# Patient Record
Sex: FEMALE | Race: WHITE | NOT HISPANIC OR LATINO | Employment: OTHER | URBAN - METROPOLITAN AREA
[De-identification: names, ages, dates, MRNs, and addresses within clinical notes are randomized per-mention and may not be internally consistent; named-entity substitution may affect disease eponyms.]

---

## 2019-10-22 ENCOUNTER — HOSPITAL ENCOUNTER (OUTPATIENT)
Facility: HOSPITAL | Age: 84
Setting detail: OBSERVATION
Discharge: HOME HEALTH | End: 2019-10-24
Attending: EMERGENCY MEDICINE | Admitting: INTERNAL MEDICINE

## 2019-10-22 ENCOUNTER — APPOINTMENT (OUTPATIENT)
Dept: GENERAL RADIOLOGY | Facility: HOSPITAL | Age: 84
End: 2019-10-22

## 2019-10-22 DIAGNOSIS — N18.9 ACUTE ON CHRONIC RENAL INSUFFICIENCY: ICD-10-CM

## 2019-10-22 DIAGNOSIS — N28.9 ACUTE ON CHRONIC RENAL INSUFFICIENCY: ICD-10-CM

## 2019-10-22 DIAGNOSIS — Z86.59 HISTORY OF DEMENTIA: ICD-10-CM

## 2019-10-22 DIAGNOSIS — Z86.79 HISTORY OF CONGESTIVE HEART FAILURE: ICD-10-CM

## 2019-10-22 DIAGNOSIS — Z86.79 HISTORY OF HYPERTENSION: ICD-10-CM

## 2019-10-22 DIAGNOSIS — I48.92 ATRIAL FLUTTER WITH RAPID VENTRICULAR RESPONSE (HCC): Primary | ICD-10-CM

## 2019-10-22 PROBLEM — I10 ESSENTIAL HYPERTENSION: Status: ACTIVE | Noted: 2019-10-22

## 2019-10-22 PROBLEM — N17.9 AKI (ACUTE KIDNEY INJURY) (HCC): Status: ACTIVE | Noted: 2019-10-22

## 2019-10-22 PROBLEM — D72.829 LEUKOCYTOSIS: Status: ACTIVE | Noted: 2019-10-22

## 2019-10-22 PROBLEM — R77.8 ELEVATED TROPONIN: Status: ACTIVE | Noted: 2019-10-22

## 2019-10-22 PROBLEM — E87.1 HYPONATREMIA: Status: ACTIVE | Noted: 2019-10-22

## 2019-10-22 PROBLEM — I95.9 HYPOTENSION: Status: ACTIVE | Noted: 2019-10-22

## 2019-10-22 PROBLEM — F03.90 DEMENTIA (HCC): Status: ACTIVE | Noted: 2019-10-22

## 2019-10-22 PROBLEM — I50.9 CHF (CONGESTIVE HEART FAILURE) (HCC): Status: ACTIVE | Noted: 2019-10-22

## 2019-10-22 PROBLEM — E11.9 TYPE 2 DIABETES MELLITUS (HCC): Status: ACTIVE | Noted: 2019-10-22

## 2019-10-22 LAB
ALBUMIN SERPL-MCNC: 4 G/DL (ref 3.5–5.2)
ALBUMIN/GLOB SERPL: 1.4 G/DL
ALP SERPL-CCNC: 48 U/L (ref 39–117)
ALT SERPL W P-5'-P-CCNC: 10 U/L (ref 1–33)
ANION GAP SERPL CALCULATED.3IONS-SCNC: 15 MMOL/L (ref 5–15)
AST SERPL-CCNC: 11 U/L (ref 1–32)
BASOPHILS # BLD AUTO: 0.02 10*3/MM3 (ref 0–0.2)
BASOPHILS NFR BLD AUTO: 0.1 % (ref 0–1.5)
BILIRUB SERPL-MCNC: 0.5 MG/DL (ref 0.2–1.2)
BUN BLD-MCNC: 90 MG/DL (ref 8–23)
BUN/CREAT SERPL: 24.6 (ref 7–25)
CALCIUM SPEC-SCNC: 9.3 MG/DL (ref 8.2–9.6)
CHLORIDE SERPL-SCNC: 93 MMOL/L (ref 98–107)
CO2 SERPL-SCNC: 22 MMOL/L (ref 22–29)
CREAT BLD-MCNC: 3.66 MG/DL (ref 0.57–1)
DEPRECATED RDW RBC AUTO: 44.8 FL (ref 37–54)
EOSINOPHIL # BLD AUTO: 0.06 10*3/MM3 (ref 0–0.4)
EOSINOPHIL NFR BLD AUTO: 0.4 % (ref 0.3–6.2)
ERYTHROCYTE [DISTWIDTH] IN BLOOD BY AUTOMATED COUNT: 14.5 % (ref 12.3–15.4)
GFR SERPL CREATININE-BSD FRML MDRD: 12 ML/MIN/1.73
GFR SERPL CREATININE-BSD FRML MDRD: ABNORMAL ML/MIN/{1.73_M2}
GLOBULIN UR ELPH-MCNC: 2.8 GM/DL
GLUCOSE BLD-MCNC: 231 MG/DL (ref 65–99)
GLUCOSE BLDC GLUCOMTR-MCNC: 242 MG/DL (ref 70–130)
HCT VFR BLD AUTO: 38.4 % (ref 34–46.6)
HGB BLD-MCNC: 12 G/DL (ref 12–15.9)
HOLD SPECIMEN: NORMAL
HOLD SPECIMEN: NORMAL
IMM GRANULOCYTES # BLD AUTO: 0.09 10*3/MM3 (ref 0–0.05)
IMM GRANULOCYTES NFR BLD AUTO: 0.6 % (ref 0–0.5)
LYMPHOCYTES # BLD AUTO: 1.4 10*3/MM3 (ref 0.7–3.1)
LYMPHOCYTES NFR BLD AUTO: 9.4 % (ref 19.6–45.3)
MAGNESIUM SERPL-MCNC: 2 MG/DL (ref 1.6–2.4)
MCH RBC QN AUTO: 26.7 PG (ref 26.6–33)
MCHC RBC AUTO-ENTMCNC: 31.3 G/DL (ref 31.5–35.7)
MCV RBC AUTO: 85.3 FL (ref 79–97)
MONOCYTES # BLD AUTO: 0.86 10*3/MM3 (ref 0.1–0.9)
MONOCYTES NFR BLD AUTO: 5.8 % (ref 5–12)
NEUTROPHILS # BLD AUTO: 12.49 10*3/MM3 (ref 1.7–7)
NEUTROPHILS NFR BLD AUTO: 83.7 % (ref 42.7–76)
NRBC BLD AUTO-RTO: 0 /100 WBC (ref 0–0.2)
NT-PROBNP SERPL-MCNC: 945.1 PG/ML (ref 5–1800)
PLATELET # BLD AUTO: 328 10*3/MM3 (ref 140–450)
PMV BLD AUTO: 9.8 FL (ref 6–12)
POTASSIUM BLD-SCNC: 4.1 MMOL/L (ref 3.5–5.2)
PROT SERPL-MCNC: 6.8 G/DL (ref 6–8.5)
RBC # BLD AUTO: 4.5 10*6/MM3 (ref 3.77–5.28)
SODIUM BLD-SCNC: 130 MMOL/L (ref 136–145)
TROPONIN T SERPL-MCNC: 0.02 NG/ML (ref 0–0.03)
TROPONIN T SERPL-MCNC: 0.04 NG/ML (ref 0–0.03)
WBC NRBC COR # BLD: 14.92 10*3/MM3 (ref 3.4–10.8)
WHOLE BLOOD HOLD SPECIMEN: NORMAL
WHOLE BLOOD HOLD SPECIMEN: NORMAL

## 2019-10-22 PROCEDURE — A9270 NON-COVERED ITEM OR SERVICE: HCPCS | Performed by: NURSE PRACTITIONER

## 2019-10-22 PROCEDURE — 99223 1ST HOSP IP/OBS HIGH 75: CPT | Performed by: FAMILY MEDICINE

## 2019-10-22 PROCEDURE — 63710000001 INSULIN LISPRO (HUMAN) PER 5 UNITS: Performed by: NURSE PRACTITIONER

## 2019-10-22 PROCEDURE — 96361 HYDRATE IV INFUSION ADD-ON: CPT

## 2019-10-22 PROCEDURE — 84484 ASSAY OF TROPONIN QUANT: CPT | Performed by: EMERGENCY MEDICINE

## 2019-10-22 PROCEDURE — 84300 ASSAY OF URINE SODIUM: CPT | Performed by: NURSE PRACTITIONER

## 2019-10-22 PROCEDURE — 84156 ASSAY OF PROTEIN URINE: CPT | Performed by: NURSE PRACTITIONER

## 2019-10-22 PROCEDURE — G0378 HOSPITAL OBSERVATION PER HR: HCPCS

## 2019-10-22 PROCEDURE — P9612 CATHETERIZE FOR URINE SPEC: HCPCS

## 2019-10-22 PROCEDURE — 93005 ELECTROCARDIOGRAM TRACING: CPT | Performed by: NURSE PRACTITIONER

## 2019-10-22 PROCEDURE — 85025 COMPLETE CBC W/AUTO DIFF WBC: CPT | Performed by: EMERGENCY MEDICINE

## 2019-10-22 PROCEDURE — 83935 ASSAY OF URINE OSMOLALITY: CPT | Performed by: NURSE PRACTITIONER

## 2019-10-22 PROCEDURE — 63710000001 ATORVASTATIN 10 MG TABLET: Performed by: NURSE PRACTITIONER

## 2019-10-22 PROCEDURE — 93005 ELECTROCARDIOGRAM TRACING: CPT | Performed by: EMERGENCY MEDICINE

## 2019-10-22 PROCEDURE — 63710000001 BUSPIRONE 10 MG TABLET: Performed by: NURSE PRACTITIONER

## 2019-10-22 PROCEDURE — 80053 COMPREHEN METABOLIC PANEL: CPT | Performed by: EMERGENCY MEDICINE

## 2019-10-22 PROCEDURE — 82570 ASSAY OF URINE CREATININE: CPT | Performed by: NURSE PRACTITIONER

## 2019-10-22 PROCEDURE — 82962 GLUCOSE BLOOD TEST: CPT

## 2019-10-22 PROCEDURE — 99285 EMERGENCY DEPT VISIT HI MDM: CPT

## 2019-10-22 PROCEDURE — 83880 ASSAY OF NATRIURETIC PEPTIDE: CPT | Performed by: EMERGENCY MEDICINE

## 2019-10-22 PROCEDURE — 93010 ELECTROCARDIOGRAM REPORT: CPT | Performed by: INTERNAL MEDICINE

## 2019-10-22 PROCEDURE — 84484 ASSAY OF TROPONIN QUANT: CPT | Performed by: NURSE PRACTITIONER

## 2019-10-22 PROCEDURE — 71045 X-RAY EXAM CHEST 1 VIEW: CPT

## 2019-10-22 PROCEDURE — 63710000001 BUSPIRONE 5 MG TABLET: Performed by: NURSE PRACTITIONER

## 2019-10-22 PROCEDURE — 63710000001 DONEPEZIL 10 MG TABLET: Performed by: NURSE PRACTITIONER

## 2019-10-22 PROCEDURE — 83735 ASSAY OF MAGNESIUM: CPT | Performed by: EMERGENCY MEDICINE

## 2019-10-22 RX ORDER — GLIMEPIRIDE 2 MG/1
2 TABLET ORAL
COMMUNITY

## 2019-10-22 RX ORDER — CITALOPRAM 20 MG/1
20 TABLET ORAL DAILY
Status: DISCONTINUED | OUTPATIENT
Start: 2019-10-23 | End: 2019-10-24 | Stop reason: HOSPADM

## 2019-10-22 RX ORDER — LISINOPRIL 40 MG/1
40 TABLET ORAL DAILY
COMMUNITY
End: 2019-10-24 | Stop reason: HOSPADM

## 2019-10-22 RX ORDER — SPIRONOLACTONE 25 MG/1
25 TABLET ORAL DAILY
COMMUNITY
End: 2019-10-24 | Stop reason: HOSPADM

## 2019-10-22 RX ORDER — BUSPIRONE HYDROCHLORIDE 15 MG/1
15 TABLET ORAL 2 TIMES DAILY
COMMUNITY

## 2019-10-22 RX ORDER — PREDNISONE 20 MG/1
20 TABLET ORAL DAILY
COMMUNITY
End: 2019-10-24 | Stop reason: HOSPADM

## 2019-10-22 RX ORDER — FUROSEMIDE 20 MG/1
20 TABLET ORAL DAILY
COMMUNITY
End: 2019-10-24 | Stop reason: HOSPADM

## 2019-10-22 RX ORDER — ATORVASTATIN CALCIUM 10 MG/1
10 TABLET, FILM COATED ORAL NIGHTLY
COMMUNITY

## 2019-10-22 RX ORDER — HYDROCHLOROTHIAZIDE 25 MG/1
25 TABLET ORAL DAILY
COMMUNITY
End: 2019-10-24 | Stop reason: HOSPADM

## 2019-10-22 RX ORDER — SODIUM CHLORIDE 0.9 % (FLUSH) 0.9 %
10 SYRINGE (ML) INJECTION EVERY 12 HOURS SCHEDULED
Status: DISCONTINUED | OUTPATIENT
Start: 2019-10-22 | End: 2019-10-24 | Stop reason: HOSPADM

## 2019-10-22 RX ORDER — GABAPENTIN 300 MG/1
300 CAPSULE ORAL 3 TIMES DAILY
COMMUNITY

## 2019-10-22 RX ORDER — SODIUM CHLORIDE 9 MG/ML
125 INJECTION, SOLUTION INTRAVENOUS CONTINUOUS
Status: DISCONTINUED | OUTPATIENT
Start: 2019-10-22 | End: 2019-10-22

## 2019-10-22 RX ORDER — LEVOCETIRIZINE DIHYDROCHLORIDE 2.5 MG/5ML
5 SOLUTION ORAL EVERY EVENING
COMMUNITY

## 2019-10-22 RX ORDER — HEPARIN SODIUM 5000 [USP'U]/ML
5000 INJECTION, SOLUTION INTRAVENOUS; SUBCUTANEOUS EVERY 8 HOURS SCHEDULED
Status: DISCONTINUED | OUTPATIENT
Start: 2019-10-23 | End: 2019-10-23

## 2019-10-22 RX ORDER — AMLODIPINE BESYLATE 5 MG/1
5 TABLET ORAL DAILY
COMMUNITY
End: 2019-10-24 | Stop reason: HOSPADM

## 2019-10-22 RX ORDER — ACETAMINOPHEN 650 MG/1
650 SUPPOSITORY RECTAL EVERY 4 HOURS PRN
Status: DISCONTINUED | OUTPATIENT
Start: 2019-10-22 | End: 2019-10-24 | Stop reason: HOSPADM

## 2019-10-22 RX ORDER — CITALOPRAM 20 MG/1
20 TABLET ORAL DAILY
COMMUNITY

## 2019-10-22 RX ORDER — CLOPIDOGREL BISULFATE 75 MG/1
75 TABLET ORAL DAILY
COMMUNITY
End: 2019-10-24 | Stop reason: HOSPADM

## 2019-10-22 RX ORDER — ATORVASTATIN CALCIUM 10 MG/1
10 TABLET, FILM COATED ORAL NIGHTLY
Status: DISCONTINUED | OUTPATIENT
Start: 2019-10-22 | End: 2019-10-24 | Stop reason: HOSPADM

## 2019-10-22 RX ORDER — DONEPEZIL HYDROCHLORIDE 10 MG/1
10 TABLET, FILM COATED ORAL NIGHTLY
COMMUNITY

## 2019-10-22 RX ORDER — OXYBUTYNIN CHLORIDE 5 MG/1
5 TABLET ORAL DAILY
COMMUNITY

## 2019-10-22 RX ORDER — ACETAMINOPHEN 325 MG/1
650 TABLET ORAL EVERY 4 HOURS PRN
Status: DISCONTINUED | OUTPATIENT
Start: 2019-10-22 | End: 2019-10-24 | Stop reason: HOSPADM

## 2019-10-22 RX ORDER — SODIUM CHLORIDE 0.9 % (FLUSH) 0.9 %
10 SYRINGE (ML) INJECTION AS NEEDED
Status: DISCONTINUED | OUTPATIENT
Start: 2019-10-22 | End: 2019-10-24 | Stop reason: HOSPADM

## 2019-10-22 RX ORDER — DEXTROSE MONOHYDRATE 25 G/50ML
25 INJECTION, SOLUTION INTRAVENOUS
Status: DISCONTINUED | OUTPATIENT
Start: 2019-10-22 | End: 2019-10-24 | Stop reason: HOSPADM

## 2019-10-22 RX ORDER — SODIUM CHLORIDE 9 MG/ML
75 INJECTION, SOLUTION INTRAVENOUS CONTINUOUS
Status: DISCONTINUED | OUTPATIENT
Start: 2019-10-22 | End: 2019-10-23

## 2019-10-22 RX ORDER — OXYBUTYNIN CHLORIDE 5 MG/1
5 TABLET ORAL DAILY
Status: DISCONTINUED | OUTPATIENT
Start: 2019-10-23 | End: 2019-10-24 | Stop reason: HOSPADM

## 2019-10-22 RX ORDER — ASPIRIN 325 MG
325 TABLET ORAL ONCE
Status: COMPLETED | OUTPATIENT
Start: 2019-10-22 | End: 2019-10-22

## 2019-10-22 RX ORDER — DONEPEZIL HYDROCHLORIDE 10 MG/1
10 TABLET, FILM COATED ORAL NIGHTLY
Status: DISCONTINUED | OUTPATIENT
Start: 2019-10-22 | End: 2019-10-24 | Stop reason: HOSPADM

## 2019-10-22 RX ORDER — QUETIAPINE FUMARATE 25 MG/1
12.5 TABLET, FILM COATED ORAL DAILY PRN
COMMUNITY

## 2019-10-22 RX ORDER — NICOTINE POLACRILEX 4 MG
15 LOZENGE BUCCAL
Status: DISCONTINUED | OUTPATIENT
Start: 2019-10-22 | End: 2019-10-24 | Stop reason: HOSPADM

## 2019-10-22 RX ORDER — CLOPIDOGREL BISULFATE 75 MG/1
75 TABLET ORAL DAILY
Status: DISCONTINUED | OUTPATIENT
Start: 2019-10-23 | End: 2019-10-23

## 2019-10-22 RX ORDER — ACETAMINOPHEN 160 MG/5ML
650 SOLUTION ORAL EVERY 4 HOURS PRN
Status: DISCONTINUED | OUTPATIENT
Start: 2019-10-22 | End: 2019-10-24 | Stop reason: HOSPADM

## 2019-10-22 RX ADMIN — DONEPEZIL HYDROCHLORIDE 10 MG: 10 TABLET, FILM COATED ORAL at 23:44

## 2019-10-22 RX ADMIN — SODIUM CHLORIDE 75 ML/HR: 9 INJECTION, SOLUTION INTRAVENOUS at 23:53

## 2019-10-22 RX ADMIN — INSULIN LISPRO 3 UNITS: 100 INJECTION, SOLUTION INTRAVENOUS; SUBCUTANEOUS at 23:46

## 2019-10-22 RX ADMIN — BUSPIRONE HYDROCHLORIDE 15 MG: 5 TABLET ORAL at 23:44

## 2019-10-22 RX ADMIN — ATORVASTATIN CALCIUM 10 MG: 10 TABLET, FILM COATED ORAL at 23:44

## 2019-10-22 RX ADMIN — SODIUM CHLORIDE 125 ML/HR: 9 INJECTION, SOLUTION INTRAVENOUS at 16:38

## 2019-10-22 RX ADMIN — ASPIRIN 325 MG ORAL TABLET 325 MG: 325 PILL ORAL at 16:39

## 2019-10-22 NOTE — ED PROVIDER NOTES
Subjective   Allyn Dodge is a 89 y.o. female with a history of dementia presenting to the emergency department via EMS complaining of hypotension. Of note, she was recently diagnosed with atrial flutter and prescribed a beta blocker. EMS reports that she took her first dose at 1400. While receiving at-home PT shortly after, she became increasingly lethargic and hypotensive, prompting a call to EMS at 1500. Upon EMS arrival, her blood pressure measured 94/34. Currently, she denies any lightheadedness, chest pain, abdominal pain, shortness of breath, or palpitations. She also denies any recent illness, falls, or head injuries. She is unsure if she is anticoagulated. She reports an allergy to penicillin and codeine. She lives at home. There are no other acute complaints at this time.        History provided by:  Patient  History limited by:  Dementia  Hypotension   Quality:  94/34  Severity:  Moderate  Onset quality:  Sudden  Duration:  2 hours  Chronicity:  New  Context:  Onset shortly after taking first dose of beta blocker  Associated symptoms: no abdominal pain, no chest pain and no shortness of breath        Review of Systems   Respiratory: Negative for shortness of breath.    Cardiovascular: Negative for chest pain and palpitations.   Gastrointestinal: Negative for abdominal pain.   Neurological: Negative for light-headedness.   All other systems reviewed and are negative.      Past Medical History:   Diagnosis Date   • Atrial flutter (CMS/HCC)    • CHF (congestive heart failure) (CMS/HCC)    • Dementia (CMS/HCC)    • Diabetes mellitus (CMS/HCC)    • Hypertension    • MI (myocardial infarction) (CMS/HCC)        Allergies   Allergen Reactions   • Codeine Other (See Comments)     UNKNOWN   • Penicillins Other (See Comments)     UNKNOWN       Past Surgical History:   Procedure Laterality Date   • APPENDECTOMY     • BACK SURGERY     • CARDIAC SURGERY     • CHOLECYSTECTOMY     • CORONARY ARTERY BYPASS GRAFT      X2   •  HYSTERECTOMY     • KNEE SURGERY         Family History   Problem Relation Age of Onset   • Hypertension Mother    • Diabetes Father    • Peripheral vascular disease Father        Social History     Socioeconomic History   • Marital status:      Spouse name: Not on file   • Number of children: Not on file   • Years of education: Not on file   • Highest education level: Not on file   Tobacco Use   • Smoking status: Former Smoker     Packs/day: 1.50     Types: Cigarettes     Last attempt to quit:      Years since quittin.8   • Smokeless tobacco: Never Used   Substance and Sexual Activity   • Alcohol use: No     Frequency: Never   • Drug use: No   • Sexual activity: Defer         Objective   Physical Exam   Constitutional: She is oriented to person, place, and time. She appears well-developed and well-nourished. No distress.   HENT:   Head: Normocephalic and atraumatic.   Eyes: Conjunctivae are normal. No scleral icterus.   Neck: Normal range of motion. Neck supple.   Cardiovascular: Normal rate, regular rhythm, normal heart sounds and intact distal pulses.   No murmur heard.  Radial pulses intact bilaterally.   Pulmonary/Chest: Effort normal and breath sounds normal. No respiratory distress.   Lungs clear.   Abdominal: Soft. There is no tenderness. There is no rigidity and no guarding.   Musculoskeletal: Normal range of motion. She exhibits no edema.   Neurological: She is alert and oriented to person, place, and time.   Skin: Skin is warm and dry.   Psychiatric: She has a normal mood and affect. Her behavior is normal.   Nursing note and vitals reviewed.      Critical Care  Performed by: Erich Redman MD  Authorized by: Erich Redman MD     Critical care provider statement:     Critical care time (minutes):  45    Critical care was necessary to treat or prevent imminent or life-threatening deterioration of the following conditions:  Circulatory failure and renal failure    Critical care  was time spent personally by me on the following activities:  Development of treatment plan with patient or surrogate, discussions with consultants, evaluation of patient's response to treatment, examination of patient, ordering and performing treatments and interventions, ordering and review of laboratory studies, ordering and review of radiographic studies, pulse oximetry, re-evaluation of patient's condition and review of old charts             ED Course  ED Course as of Oct 22 2149   Tue Oct 22, 2019   1646 BP: 95/78 [RS]   1646 Heart Rate: 103 [RS]   1738 WBC: (!) 14.92 [RS]   1755 Troponin T: (!!) 0.035 [RS]   1755 Creatinine: (!) 3.66 [RS]   1758 Dr. Redman is re-evaluating the patient and updating the family.  [CH]   1802 Patient has evidence of renal insufficiency and the family reports that she has had renal insufficiency in the past but cannot recall the numbers.  We do not have any access to labs here at this time though we will request them from the other outside facility.  Patient also has evidence of the atrial flutter with variable block.  We will plan admission for further evaluation.  Hospitalist paged for admission.  [RS]   1803 Paged hospitalist for admission.  [CH]   1805 Outside records requested.  [RS]   1821 Discussed the case in detail with on-call hospitalist, who will admit.  [CH]      ED Course User Index  [CH] Odalis Sales  [RS] Erich Redman MD     Recent Results (from the past 24 hour(s))   Comprehensive Metabolic Panel    Collection Time: 10/22/19  5:02 PM   Result Value Ref Range    Glucose 231 (H) 65 - 99 mg/dL    BUN 90 (H) 8 - 23 mg/dL    Creatinine 3.66 (H) 0.57 - 1.00 mg/dL    Sodium 130 (L) 136 - 145 mmol/L    Potassium 4.1 3.5 - 5.2 mmol/L    Chloride 93 (L) 98 - 107 mmol/L    CO2 22.0 22.0 - 29.0 mmol/L    Calcium 9.3 8.2 - 9.6 mg/dL    Total Protein 6.8 6.0 - 8.5 g/dL    Albumin 4.00 3.50 - 5.20 g/dL    ALT (SGPT) 10 1 - 33 U/L    AST (SGOT) 11 1 - 32 U/L     Alkaline Phosphatase 48 39 - 117 U/L    Total Bilirubin 0.5 0.2 - 1.2 mg/dL    eGFR Non African Amer 12 (L) >60 mL/min/1.73    eGFR  African Amer      Globulin 2.8 gm/dL    A/G Ratio 1.4 g/dL    BUN/Creatinine Ratio 24.6 7.0 - 25.0    Anion Gap 15.0 5.0 - 15.0 mmol/L   Troponin    Collection Time: 10/22/19  5:02 PM   Result Value Ref Range    Troponin T 0.035 (C) 0.000 - 0.030 ng/mL   Magnesium    Collection Time: 10/22/19  5:02 PM   Result Value Ref Range    Magnesium 2.0 1.6 - 2.4 mg/dL   Light Blue Top    Collection Time: 10/22/19  5:02 PM   Result Value Ref Range    Extra Tube hold for add-on    Green Top (Gel)    Collection Time: 10/22/19  5:02 PM   Result Value Ref Range    Extra Tube Hold for add-ons.    Lavender Top    Collection Time: 10/22/19  5:02 PM   Result Value Ref Range    Extra Tube hold for add-on    Gold Top - SST    Collection Time: 10/22/19  5:02 PM   Result Value Ref Range    Extra Tube Hold for add-ons.    CBC Auto Differential    Collection Time: 10/22/19  5:02 PM   Result Value Ref Range    WBC 14.92 (H) 3.40 - 10.80 10*3/mm3    RBC 4.50 3.77 - 5.28 10*6/mm3    Hemoglobin 12.0 12.0 - 15.9 g/dL    Hematocrit 38.4 34.0 - 46.6 %    MCV 85.3 79.0 - 97.0 fL    MCH 26.7 26.6 - 33.0 pg    MCHC 31.3 (L) 31.5 - 35.7 g/dL    RDW 14.5 12.3 - 15.4 %    RDW-SD 44.8 37.0 - 54.0 fl    MPV 9.8 6.0 - 12.0 fL    Platelets 328 140 - 450 10*3/mm3    Neutrophil % 83.7 (H) 42.7 - 76.0 %    Lymphocyte % 9.4 (L) 19.6 - 45.3 %    Monocyte % 5.8 5.0 - 12.0 %    Eosinophil % 0.4 0.3 - 6.2 %    Basophil % 0.1 0.0 - 1.5 %    Immature Grans % 0.6 (H) 0.0 - 0.5 %    Neutrophils, Absolute 12.49 (H) 1.70 - 7.00 10*3/mm3    Lymphocytes, Absolute 1.40 0.70 - 3.10 10*3/mm3    Monocytes, Absolute 0.86 0.10 - 0.90 10*3/mm3    Eosinophils, Absolute 0.06 0.00 - 0.40 10*3/mm3    Basophils, Absolute 0.02 0.00 - 0.20 10*3/mm3    Immature Grans, Absolute 0.09 (H) 0.00 - 0.05 10*3/mm3    nRBC 0.0 0.0 - 0.2 /100 WBC   BNP    Collection  "Time: 10/22/19  5:02 PM   Result Value Ref Range    proBNP 945.1 5.0-1,800.0 pg/mL   Troponin    Collection Time: 10/22/19  7:51 PM   Result Value Ref Range    Troponin T 0.024 0.000 - 0.030 ng/mL     Note: In addition to lab results from this visit, the labs listed above may include labs taken at another facility or during a different encounter within the last 24 hours. Please correlate lab times with ED admission and discharge times for further clarification of the services performed during this visit.    XR Chest 1 View   Final Result   Chronic pulmonary findings. There are no acute   abnormalities.       D:  10/22/2019   E:  10/22/2019       This report was finalized on 10/22/2019 5:41 PM by Dr. Honorio Rios MD.            Vitals:    10/22/19 1756 10/22/19 1800 10/22/19 1801 10/22/19 1906   BP:  107/80     Pulse:   106    Resp:   20 20   Temp: 99.2 °F (37.3 °C)      TempSrc: Oral      SpO2:   94% 95%   Weight: 90.7 kg (200 lb)      Height: 160 cm (63\")        Medications   sodium chloride 0.9 % flush 10 mL (not administered)   sodium chloride 0.9 % infusion (125 mL/hr Intravenous Currently Infusing 10/22/19 2036)   aspirin tablet 325 mg (325 mg Oral Given 10/22/19 1639)     ECG/EMG Results (last 24 hours)     Procedure Component Value Units Date/Time    ECG 12 Lead [[id number]] Collected:  10/22/19 1611     Updated:  10/22/19 1647    Narrative:       Test Reason : Weak/Dizzy/AMS protocol  Blood Pressure : **/** mmHG  Vent. Rate : 096 BPM     Atrial Rate : 250 BPM     P-R Int : 000 ms          QRS Dur : 092 ms      QT Int : 352 ms       P-R-T Axes : 000 064 -55 degrees     QTc Int : 444 ms    Atrial flutter with variable AV block  ST & T wave abnormality, consider inferior ischemia  Abnormal ECG  No previous ECGs available  Confirmed by CRUZITO PIERRE MD ([id number]) on 10/22/2019 4:47:50 PM    Referred By:  EDMD           Confirmed By:CRUZITO PIERRE MD        ECG 12 Lead   Final Result   Test Reason : Weak/Dizzy/AMS " protocol   Blood Pressure : **/** mmHG   Vent. Rate : 096 BPM     Atrial Rate : 250 BPM      P-R Int : 000 ms          QRS Dur : 092 ms       QT Int : 352 ms       P-R-T Axes : 000 064 -55 degrees      QTc Int : 444 ms      Atrial flutter with variable AV block   ST & T wave abnormality, consider inferior ischemia   Abnormal ECG   No previous ECGs available   Confirmed by ERICH REDMAN MD ([id number]) on 10/22/2019 4:47:50 PM      Referred By:  EDMD           Confirmed By:ERICH REDMAN MD      ECG 12 Lead    (Results Pending)                       MDM  Number of Diagnoses or Management Options  Acute on chronic renal insufficiency:   Atrial flutter with rapid ventricular response (CMS/HCC):   History of congestive heart failure:   History of dementia:   History of hypertension:      Amount and/or Complexity of Data Reviewed  Clinical lab tests: reviewed  Tests in the radiology section of CPT®: reviewed  Decide to obtain previous medical records or to obtain history from someone other than the patient: yes  Obtain history from someone other than the patient: yes  Discuss the patient with other providers: yes  Independent visualization of images, tracings, or specimens: yes    Critical Care  Total time providing critical care: 30-74 minutes      Final diagnoses:   Atrial flutter with rapid ventricular response (CMS/HCC)   Acute on chronic renal insufficiency   History of hypertension   History of dementia   History of congestive heart failure       Documentation assistance provided by deidre Sales.  Information recorded by the scribe was done at my direction and has been verified and validated by me.     Odalis Sales  10/22/19 171       Odalis Sales  10/22/19 7118       Erich Redman MD  10/22/19 1777

## 2019-10-23 ENCOUNTER — APPOINTMENT (OUTPATIENT)
Dept: CARDIOLOGY | Facility: HOSPITAL | Age: 84
End: 2019-10-23

## 2019-10-23 ENCOUNTER — APPOINTMENT (OUTPATIENT)
Dept: ULTRASOUND IMAGING | Facility: HOSPITAL | Age: 84
End: 2019-10-23

## 2019-10-23 PROBLEM — T07.XXXA ABRASIONS OF MULTIPLE SITES: Status: ACTIVE | Noted: 2019-10-23

## 2019-10-23 LAB
ANION GAP SERPL CALCULATED.3IONS-SCNC: 15 MMOL/L (ref 5–15)
BACTERIA UR QL AUTO: ABNORMAL /HPF
BASOPHILS # BLD AUTO: 0 10*3/MM3 (ref 0–0.2)
BASOPHILS NFR BLD AUTO: 0 % (ref 0–1.5)
BH CV ECHO MEAS - AO MAX PG (FULL): 0.45 MMHG
BH CV ECHO MEAS - AO MAX PG: 4.3 MMHG
BH CV ECHO MEAS - AO ROOT AREA (BSA CORRECTED): 1.9
BH CV ECHO MEAS - AO ROOT AREA: 9.3 CM^2
BH CV ECHO MEAS - AO ROOT DIAM: 3.4 CM
BH CV ECHO MEAS - AO V2 MAX: 104.2 CM/SEC
BH CV ECHO MEAS - AVA(V,A): 2.7 CM^2
BH CV ECHO MEAS - AVA(V,D): 2.7 CM^2
BH CV ECHO MEAS - BSA(HAYCOCK): 1.9 M^2
BH CV ECHO MEAS - BSA: 1.9 M^2
BH CV ECHO MEAS - BZI_BMI: 32.2 KILOGRAMS/M^2
BH CV ECHO MEAS - BZI_METRIC_HEIGHT: 160 CM
BH CV ECHO MEAS - BZI_METRIC_WEIGHT: 82.6 KG
BH CV ECHO MEAS - EDV(CUBED): 35.5 ML
BH CV ECHO MEAS - EDV(MOD-SP2): 34 ML
BH CV ECHO MEAS - EDV(MOD-SP4): 40 ML
BH CV ECHO MEAS - EDV(TEICH): 43.7 ML
BH CV ECHO MEAS - EF(CUBED): 63.5 %
BH CV ECHO MEAS - EF(MOD-SP2): 58.8 %
BH CV ECHO MEAS - EF(MOD-SP4): 65 %
BH CV ECHO MEAS - EF(TEICH): 56.3 %
BH CV ECHO MEAS - ESV(CUBED): 12.9 ML
BH CV ECHO MEAS - ESV(MOD-SP2): 14 ML
BH CV ECHO MEAS - ESV(MOD-SP4): 14 ML
BH CV ECHO MEAS - ESV(TEICH): 19.1 ML
BH CV ECHO MEAS - FS: 28.5 %
BH CV ECHO MEAS - IVS/LVPW: 1
BH CV ECHO MEAS - IVSD: 1.1 CM
BH CV ECHO MEAS - LA DIMENSION: 3.7 CM
BH CV ECHO MEAS - LA/AO: 1.1
BH CV ECHO MEAS - LAD MAJOR: 5.2 CM
BH CV ECHO MEAS - LAT PEAK E' VEL: 12.8 CM/SEC
BH CV ECHO MEAS - LATERAL E/E' RATIO: 5.9
BH CV ECHO MEAS - LV DIASTOLIC VOL/BSA (35-75): 21.5 ML/M^2
BH CV ECHO MEAS - LV MASS(C)D: 112 GRAMS
BH CV ECHO MEAS - LV MASS(C)DI: 60.3 GRAMS/M^2
BH CV ECHO MEAS - LV MAX PG: 3.9 MMHG
BH CV ECHO MEAS - LV SYSTOLIC VOL/BSA (12-30): 7.5 ML/M^2
BH CV ECHO MEAS - LV V1 MAX: 98.7 CM/SEC
BH CV ECHO MEAS - LVIDD: 3.3 CM
BH CV ECHO MEAS - LVIDS: 2.3 CM
BH CV ECHO MEAS - LVLD AP2: 5.7 CM
BH CV ECHO MEAS - LVLD AP4: 6.3 CM
BH CV ECHO MEAS - LVLS AP2: 5.5 CM
BH CV ECHO MEAS - LVLS AP4: 5.2 CM
BH CV ECHO MEAS - LVOT AREA (M): 2.8 CM^2
BH CV ECHO MEAS - LVOT AREA: 2.8 CM^2
BH CV ECHO MEAS - LVOT DIAM: 1.9 CM
BH CV ECHO MEAS - LVPWD: 1.1 CM
BH CV ECHO MEAS - MED PEAK E' VEL: 8 CM/SEC
BH CV ECHO MEAS - MEDIAL E/E' RATIO: 9.5
BH CV ECHO MEAS - MV A MAX VEL: 44.7 CM/SEC
BH CV ECHO MEAS - MV DEC TIME: 0.13 SEC
BH CV ECHO MEAS - MV E MAX VEL: 76.7 CM/SEC
BH CV ECHO MEAS - MV E/A: 1.7
BH CV ECHO MEAS - PA ACC SLOPE: 979.9 CM/SEC^2
BH CV ECHO MEAS - PA ACC TIME: 0.07 SEC
BH CV ECHO MEAS - PA PR(ACCEL): 48.9 MMHG
BH CV ECHO MEAS - RAP SYSTOLE: 3 MMHG
BH CV ECHO MEAS - RVSP: 25 MMHG
BH CV ECHO MEAS - SI(CUBED): 12.1 ML/M^2
BH CV ECHO MEAS - SI(MOD-SP2): 10.8 ML/M^2
BH CV ECHO MEAS - SI(MOD-SP4): 14 ML/M^2
BH CV ECHO MEAS - SI(TEICH): 13.2 ML/M^2
BH CV ECHO MEAS - SV(CUBED): 22.5 ML
BH CV ECHO MEAS - SV(MOD-SP2): 20 ML
BH CV ECHO MEAS - SV(MOD-SP4): 26 ML
BH CV ECHO MEAS - SV(TEICH): 24.6 ML
BH CV ECHO MEAS - TR MAX PG: 21 MMHG
BH CV ECHO MEAS - TR MAX VEL: 229 CM/SEC
BH CV ECHO MEASUREMENTS AVERAGE E/E' RATIO: 7.38
BH CV VAS BP LEFT ARM: NORMAL MMHG
BH CV XLRA - RV BASE: 4.2 CM
BH CV XLRA - RV LENGTH: 5.9 CM
BH CV XLRA - RV MID: 3.7 CM
BILIRUB UR QL STRIP: NEGATIVE
BUN BLD-MCNC: 87 MG/DL (ref 8–23)
BUN/CREAT SERPL: 25.4 (ref 7–25)
CALCIUM SPEC-SCNC: 9.2 MG/DL (ref 8.2–9.6)
CHLORIDE SERPL-SCNC: 94 MMOL/L (ref 98–107)
CHOLEST SERPL-MCNC: 132 MG/DL (ref 0–200)
CLARITY UR: CLEAR
CO2 SERPL-SCNC: 24 MMOL/L (ref 22–29)
COLOR UR: YELLOW
CREAT BLD-MCNC: 3.42 MG/DL (ref 0.57–1)
CREAT UR-MCNC: 84.1 MG/DL
D-LACTATE SERPL-SCNC: 1.4 MMOL/L (ref 0.5–2)
DEPRECATED RDW RBC AUTO: 43.9 FL (ref 37–54)
EOSINOPHIL # BLD AUTO: 0 10*3/MM3 (ref 0–0.4)
EOSINOPHIL NFR BLD AUTO: 0 % (ref 0.3–6.2)
ERYTHROCYTE [DISTWIDTH] IN BLOOD BY AUTOMATED COUNT: 14.3 % (ref 12.3–15.4)
GFR SERPL CREATININE-BSD FRML MDRD: 13 ML/MIN/1.73
GFR SERPL CREATININE-BSD FRML MDRD: ABNORMAL ML/MIN/{1.73_M2}
GLUCOSE BLD-MCNC: 174 MG/DL (ref 65–99)
GLUCOSE BLDC GLUCOMTR-MCNC: 122 MG/DL (ref 70–130)
GLUCOSE BLDC GLUCOMTR-MCNC: 133 MG/DL (ref 70–130)
GLUCOSE BLDC GLUCOMTR-MCNC: 147 MG/DL (ref 70–130)
GLUCOSE BLDC GLUCOMTR-MCNC: 77 MG/DL (ref 70–130)
GLUCOSE UR STRIP-MCNC: NEGATIVE MG/DL
HBA1C MFR BLD: 10.4 % (ref 4.8–5.6)
HCT VFR BLD AUTO: 35 % (ref 34–46.6)
HDLC SERPL-MCNC: 45 MG/DL (ref 40–60)
HGB BLD-MCNC: 11 G/DL (ref 12–15.9)
HGB UR QL STRIP.AUTO: NEGATIVE
HYALINE CASTS UR QL AUTO: ABNORMAL /LPF
IMM GRANULOCYTES # BLD AUTO: 0.05 10*3/MM3 (ref 0–0.05)
IMM GRANULOCYTES NFR BLD AUTO: 0.6 % (ref 0–0.5)
KETONES UR QL STRIP: NEGATIVE
LDLC SERPL CALC-MCNC: 68 MG/DL (ref 0–100)
LDLC/HDLC SERPL: 1.52 {RATIO}
LEFT ATRIUM VOLUME INDEX: 26.9 ML/M^2
LEFT ATRIUM VOLUME: 50 ML
LEUKOCYTE ESTERASE UR QL STRIP.AUTO: ABNORMAL
LYMPHOCYTES # BLD AUTO: 0.98 10*3/MM3 (ref 0.7–3.1)
LYMPHOCYTES NFR BLD AUTO: 11.2 % (ref 19.6–45.3)
MCH RBC QN AUTO: 26.6 PG (ref 26.6–33)
MCHC RBC AUTO-ENTMCNC: 31.4 G/DL (ref 31.5–35.7)
MCV RBC AUTO: 84.5 FL (ref 79–97)
MONOCYTES # BLD AUTO: 0.48 10*3/MM3 (ref 0.1–0.9)
MONOCYTES NFR BLD AUTO: 5.5 % (ref 5–12)
NEUTROPHILS # BLD AUTO: 7.23 10*3/MM3 (ref 1.7–7)
NEUTROPHILS NFR BLD AUTO: 82.7 % (ref 42.7–76)
NITRITE UR QL STRIP: NEGATIVE
NRBC BLD AUTO-RTO: 0 /100 WBC (ref 0–0.2)
OSMOLALITY SERPL: 316 MOSM/KG (ref 275–295)
OSMOLALITY UR: 377 MOSM/KG (ref 300–1100)
PH UR STRIP.AUTO: <=5 [PH] (ref 5–8)
PHOSPHATE SERPL-MCNC: 5.4 MG/DL (ref 2.5–4.5)
PLATELET # BLD AUTO: 302 10*3/MM3 (ref 140–450)
PMV BLD AUTO: 9.9 FL (ref 6–12)
POTASSIUM BLD-SCNC: 4.1 MMOL/L (ref 3.5–5.2)
PROCALCITONIN SERPL-MCNC: 0.09 NG/ML (ref 0.1–0.25)
PROT UR QL STRIP: NEGATIVE
PROT UR-MCNC: 11.3 MG/DL
RBC # BLD AUTO: 4.14 10*6/MM3 (ref 3.77–5.28)
RBC # UR: ABNORMAL /HPF
REF LAB TEST METHOD: ABNORMAL
SODIUM BLD-SCNC: 133 MMOL/L (ref 136–145)
SODIUM UR-SCNC: 89 MMOL/L
SP GR UR STRIP: 1.01 (ref 1–1.03)
SQUAMOUS #/AREA URNS HPF: ABNORMAL /HPF
T4 FREE SERPL-MCNC: 2.1 NG/DL (ref 0.93–1.7)
TRIGL SERPL-MCNC: 94 MG/DL (ref 0–150)
TROPONIN T SERPL-MCNC: 0.03 NG/ML (ref 0–0.03)
TSH SERPL DL<=0.05 MIU/L-ACNC: 0.14 UIU/ML (ref 0.27–4.2)
UROBILINOGEN UR QL STRIP: ABNORMAL
VLDLC SERPL-MCNC: 18.8 MG/DL
WBC NRBC COR # BLD: 8.74 10*3/MM3 (ref 3.4–10.8)
WBC UR QL AUTO: ABNORMAL /HPF

## 2019-10-23 PROCEDURE — 80061 LIPID PANEL: CPT | Performed by: NURSE PRACTITIONER

## 2019-10-23 PROCEDURE — A9270 NON-COVERED ITEM OR SERVICE: HCPCS | Performed by: FAMILY MEDICINE

## 2019-10-23 PROCEDURE — 25010000002 HEPARIN (PORCINE) PER 1000 UNITS: Performed by: NURSE PRACTITIONER

## 2019-10-23 PROCEDURE — G0378 HOSPITAL OBSERVATION PER HR: HCPCS

## 2019-10-23 PROCEDURE — 81001 URINALYSIS AUTO W/SCOPE: CPT | Performed by: EMERGENCY MEDICINE

## 2019-10-23 PROCEDURE — 99204 OFFICE O/P NEW MOD 45 MIN: CPT | Performed by: INTERNAL MEDICINE

## 2019-10-23 PROCEDURE — 93306 TTE W/DOPPLER COMPLETE: CPT

## 2019-10-23 PROCEDURE — A9270 NON-COVERED ITEM OR SERVICE: HCPCS | Performed by: NURSE PRACTITIONER

## 2019-10-23 PROCEDURE — 83605 ASSAY OF LACTIC ACID: CPT | Performed by: FAMILY MEDICINE

## 2019-10-23 PROCEDURE — 83930 ASSAY OF BLOOD OSMOLALITY: CPT | Performed by: NURSE PRACTITIONER

## 2019-10-23 PROCEDURE — 85025 COMPLETE CBC W/AUTO DIFF WBC: CPT | Performed by: NURSE PRACTITIONER

## 2019-10-23 PROCEDURE — 84484 ASSAY OF TROPONIN QUANT: CPT | Performed by: NURSE PRACTITIONER

## 2019-10-23 PROCEDURE — 63710000001 OXYBUTYNIN 5 MG TABLET: Performed by: NURSE PRACTITIONER

## 2019-10-23 PROCEDURE — 63710000001 MUPIROCIN 2 % OINTMENT 22 G TUBE: Performed by: FAMILY MEDICINE

## 2019-10-23 PROCEDURE — 83036 HEMOGLOBIN GLYCOSYLATED A1C: CPT | Performed by: NURSE PRACTITIONER

## 2019-10-23 PROCEDURE — 97116 GAIT TRAINING THERAPY: CPT

## 2019-10-23 PROCEDURE — 96365 THER/PROPH/DIAG IV INF INIT: CPT

## 2019-10-23 PROCEDURE — 63710000001 NYSTATIN 100000 UNIT/GM POWDER 15 G BOTTLE: Performed by: FAMILY MEDICINE

## 2019-10-23 PROCEDURE — 80048 BASIC METABOLIC PNL TOTAL CA: CPT | Performed by: NURSE PRACTITIONER

## 2019-10-23 PROCEDURE — 84443 ASSAY THYROID STIM HORMONE: CPT | Performed by: NURSE PRACTITIONER

## 2019-10-23 PROCEDURE — 25010000002 CEFTRIAXONE PER 250 MG: Performed by: FAMILY MEDICINE

## 2019-10-23 PROCEDURE — 93005 ELECTROCARDIOGRAM TRACING: CPT | Performed by: NURSE PRACTITIONER

## 2019-10-23 PROCEDURE — 84100 ASSAY OF PHOSPHORUS: CPT | Performed by: FAMILY MEDICINE

## 2019-10-23 PROCEDURE — 99232 SBSQ HOSP IP/OBS MODERATE 35: CPT | Performed by: FAMILY MEDICINE

## 2019-10-23 PROCEDURE — 63710000001 BUSPIRONE 5 MG TABLET: Performed by: NURSE PRACTITIONER

## 2019-10-23 PROCEDURE — 96361 HYDRATE IV INFUSION ADD-ON: CPT

## 2019-10-23 PROCEDURE — 97162 PT EVAL MOD COMPLEX 30 MIN: CPT

## 2019-10-23 PROCEDURE — 87040 BLOOD CULTURE FOR BACTERIA: CPT | Performed by: FAMILY MEDICINE

## 2019-10-23 PROCEDURE — 82962 GLUCOSE BLOOD TEST: CPT

## 2019-10-23 PROCEDURE — 93306 TTE W/DOPPLER COMPLETE: CPT | Performed by: INTERNAL MEDICINE

## 2019-10-23 PROCEDURE — 63710000001 CITALOPRAM 20 MG TABLET: Performed by: NURSE PRACTITIONER

## 2019-10-23 PROCEDURE — 84145 PROCALCITONIN (PCT): CPT | Performed by: FAMILY MEDICINE

## 2019-10-23 PROCEDURE — 63710000001 BUSPIRONE 10 MG TABLET: Performed by: NURSE PRACTITIONER

## 2019-10-23 PROCEDURE — 63710000001 CLOPIDOGREL 75 MG TABLET: Performed by: NURSE PRACTITIONER

## 2019-10-23 PROCEDURE — 84439 ASSAY OF FREE THYROXINE: CPT | Performed by: INTERNAL MEDICINE

## 2019-10-23 PROCEDURE — 76775 US EXAM ABDO BACK WALL LIM: CPT

## 2019-10-23 PROCEDURE — 96372 THER/PROPH/DIAG INJ SC/IM: CPT

## 2019-10-23 RX ORDER — NYSTATIN 100000 [USP'U]/G
POWDER TOPICAL EVERY 12 HOURS SCHEDULED
Status: DISCONTINUED | OUTPATIENT
Start: 2019-10-23 | End: 2019-10-24 | Stop reason: HOSPADM

## 2019-10-23 RX ORDER — METHIMAZOLE 5 MG/1
5 TABLET ORAL DAILY
Status: DISCONTINUED | OUTPATIENT
Start: 2019-10-23 | End: 2019-10-24 | Stop reason: HOSPADM

## 2019-10-23 RX ADMIN — NYSTATIN: 100000 POWDER TOPICAL at 09:02

## 2019-10-23 RX ADMIN — NYSTATIN: 100000 POWDER TOPICAL at 20:08

## 2019-10-23 RX ADMIN — ATORVASTATIN CALCIUM 10 MG: 10 TABLET, FILM COATED ORAL at 20:08

## 2019-10-23 RX ADMIN — BUSPIRONE HYDROCHLORIDE 15 MG: 5 TABLET ORAL at 08:52

## 2019-10-23 RX ADMIN — MUPIROCIN: 20 OINTMENT TOPICAL at 01:30

## 2019-10-23 RX ADMIN — NYSTATIN: 100000 POWDER TOPICAL at 01:30

## 2019-10-23 RX ADMIN — METOPROLOL TARTRATE 25 MG: 25 TABLET ORAL at 20:08

## 2019-10-23 RX ADMIN — SODIUM CHLORIDE, PRESERVATIVE FREE 10 ML: 5 INJECTION INTRAVENOUS at 08:54

## 2019-10-23 RX ADMIN — SODIUM CHLORIDE, PRESERVATIVE FREE 10 ML: 5 INJECTION INTRAVENOUS at 20:09

## 2019-10-23 RX ADMIN — METOPROLOL TARTRATE 25 MG: 25 TABLET ORAL at 11:59

## 2019-10-23 RX ADMIN — APIXABAN 2.5 MG: 2.5 TABLET, FILM COATED ORAL at 11:59

## 2019-10-23 RX ADMIN — CITALOPRAM HYDROBROMIDE 20 MG: 20 TABLET ORAL at 08:52

## 2019-10-23 RX ADMIN — CEFTRIAXONE 1 G: 1 INJECTION, POWDER, FOR SOLUTION INTRAMUSCULAR; INTRAVENOUS at 20:29

## 2019-10-23 RX ADMIN — BUSPIRONE HYDROCHLORIDE 15 MG: 5 TABLET ORAL at 20:08

## 2019-10-23 RX ADMIN — MUPIROCIN: 20 OINTMENT TOPICAL at 20:09

## 2019-10-23 RX ADMIN — METHIMAZOLE 5 MG: 5 TABLET ORAL at 15:48

## 2019-10-23 RX ADMIN — CEFTRIAXONE 1 G: 1 INJECTION, POWDER, FOR SOLUTION INTRAMUSCULAR; INTRAVENOUS at 02:27

## 2019-10-23 RX ADMIN — HEPARIN SODIUM 5000 UNITS: 5000 INJECTION, SOLUTION INTRAVENOUS; SUBCUTANEOUS at 05:30

## 2019-10-23 RX ADMIN — MUPIROCIN: 20 OINTMENT TOPICAL at 08:52

## 2019-10-23 RX ADMIN — OXYBUTYNIN CHLORIDE 5 MG: 5 TABLET ORAL at 08:52

## 2019-10-23 RX ADMIN — APIXABAN 2.5 MG: 2.5 TABLET, FILM COATED ORAL at 20:08

## 2019-10-23 RX ADMIN — DONEPEZIL HYDROCHLORIDE 10 MG: 10 TABLET, FILM COATED ORAL at 20:08

## 2019-10-23 RX ADMIN — CLOPIDOGREL BISULFATE 75 MG: 75 TABLET ORAL at 08:52

## 2019-10-23 NOTE — PROGRESS NOTES
[facility] Medicine Services  PROGRESS NOTE    Patient Name: Allyn Dodge  : 1929  MRN: [id number]    Date of Admission: 10/22/2019  Primary Care Physician: Miguel Angel Carrillo MD    Subjective   Subjective     CC:  F/U A Flutter w/RVR    HPI:  Patient seen and examined.  Nursing notes reviewed.  No acute events overnight.  Patient sitting up in bedside chair eating lunch.  She has no acute complaints.  Denies chest pain, palpitations or shortness of breath.    Review of Systems  Gen- No fevers, chills  CV- No chest pain, palpitations  Resp- No cough, dyspnea  GI- No N/V/D, abd pain    Objective   Objective     Vital Signs:   Temp:  [97.5 °F (36.4 °C)-99.2 °F (37.3 °C)] 97.5 °F (36.4 °C)  Heart Rate:  [] 100  Resp:  [16-20] 16  BP: ()/(48-84) 103/84        Physical Exam:  Constitutional: No acute distress, awake, alert  HENT: NCAT, mucous membranes moist  Respiratory: Clear to auscultation bilaterally, respiratory effort normal   Cardiovascular: IRR, no murmurs, rubs, or gallops, palpable pedal pulses bilaterally  Gastrointestinal: Positive bowel sounds, soft, nontender, nondistended  Musculoskeletal: No bilateral ankle edema  Psychiatric: Appropriate affect, cooperative  Neurologic: Oriented x 3, strength symmetric in all extremities, Cranial Nerves grossly intact to confrontation, speech clear  Skin: No rashes    Results Reviewed:    Results from last 7 days   Lab Units 10/23/19  0202 10/23/19  0018 10/22/19  1702   WBC 10*3/mm3 8.74  --  14.92*   HEMOGLOBIN g/dL 11.0*  --  12.0   HEMATOCRIT % 35.0  --  38.4   PLATELETS 10*3/mm3 302  --  328   PROCALCITONIN ng/mL  --  0.09*  --      Results from last 7 days   Lab Units 10/23/19  0202 10/23/19  0018 10/22/19  1951 10/22/19  170   SODIUM mmol/L 133*  --   --  130*   POTASSIUM mmol/L 4.1  --   --  4.1   CHLORIDE mmol/L 94*  --   --  93*   CO2 mmol/L 24.0  --   --  22.0   BUN mg/dL 87*  --   --  90*   CREATININE mg/dL  3.42*  --   --  3.66*   GLUCOSE mg/dL 174*  --   --  231*   CALCIUM mg/dL 9.2  --   --  9.3   ALT (SGPT) U/L  --   --   --  10   AST (SGOT) U/L  --   --   --  11   TROPONIN T ng/mL  --  0.025 0.024 0.035*   PROBNP pg/mL  --   --   --  945.1     Estimated Creatinine Clearance: 11.1 mL/min (A) (by C-G formula based on SCr of 3.42 mg/dL (H)).    Microbiology Results Abnormal     None          Imaging Results (last 24 hours)     Procedure Component Value Units Date/Time    XR Chest 1 View [[id number]] Collected:  10/22/19 1646     Updated:  10/22/19 1744    Narrative:       EXAMINATION: XR CHEST 1 VW-10/22/2019:      INDICATION: Weak/Dizzy/AMS triage protocol.      COMPARISON: NONE.     FINDINGS: The heart is at the upper limits of normal. There are  postoperative cardiac changes. The heart is compensated. There is no  acute pulmonary process. There is no mass or effusion.           Impression:       Chronic pulmonary findings. There are no acute  abnormalities.     D:  10/22/2019  E:  10/22/2019     This report was finalized on 10/22/2019 5:41 PM by Dr. Honorio Rios MD.             Results for orders placed during the hospital encounter of 10/22/19   Adult Transthoracic Echo Complete With Contrast if Necessary Per Protocol    Narrative · Left ventricular systolic function is normal  · Ejection fraction variable due to atrial flutter. Estimated EF appears   to be in the range of 56 - 60%.  · Right ventricular cavity is mild-to-moderately dilated. Mildly reduced   right ventricular systolic function noted.  · Mild biatrial enlargement  · Mild MAC is present. Mild mitral valve regurgitation is present.  · Mild tricuspid valve regurgitation is present.Estimated right   ventricular systolic pressure from tricuspid regurgitation is normal (<35   mmHg).          I have reviewed the medications:  Scheduled Meds:  apixaban 2.5 mg Oral Q12H   atorvastatin 10 mg Oral Nightly   busPIRone 15 mg Oral BID   ceftriaxone 1 g Intravenous  Nightly   citalopram 20 mg Oral Daily   donepezil 10 mg Oral Nightly   insulin lispro 0-7 Units Subcutaneous 4x Daily With Meals & Nightly   metoprolol tartrate 25 mg Oral Q12H   mupirocin  Topical Q12H   nystatin  Topical Q12H   oxybutynin 5 mg Oral Daily   sodium chloride 10 mL Intravenous Q12H     Continuous Infusions:   PRN Meds:.•  acetaminophen **OR** acetaminophen **OR** acetaminophen  •  dextrose  •  dextrose  •  glucagon (human recombinant)  •  sodium chloride  •  sodium chloride      Assessment/Plan   Assessment / Plan     Active Hospital Problems    Diagnosis  POA   • **Atrial flutter with rapid ventricular response (CMS/HCC) [I48.92]  Yes   • Abrasions of multiple sites [T07.XXXA]  Unknown   • Essential hypertension [I10]  Yes   • CHF (congestive heart failure) (CMS/HCC) [I50.9]  Yes   • Type 2 diabetes mellitus (CMS/HCC) [E11.9]  Yes   • Dementia (CMS/HCC) [F03.90]  Yes   • PRETTY (acute kidney injury) (CMS/HCC) [N17.9]  Yes   • Elevated troponin [R79.89]  Yes   • Hyponatremia [E87.1]  Yes   • Leukocytosis [D72.829]  Yes   • Hypotension [I95.9]  Yes      Resolved Hospital Problems   No resolved problems to display.        Brief Hospital Course to date:  Allyn Dodge is a 89 y.o. female presenting to the ED with complaint of weakness, irregular heartbeat, and low blood pressure who was found to have concern for atrial flutter with RVR and PRETTY with hyponatremia and leukocytosis.    Atrial flutter with RVR, CHADS VASC 5  - Request records from [facility], still waiting on these  - Family notes that she has not followed along with cardiology since her CABG approximately 7 to 8 years ago  -Pt seen in consult by Cardiology, Dr. Cartwright with whom I have personally discussed her case  -He recommends to stop patient's Plavix and start her on Eliquis 2.5 mg p.o. 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  No acute abnormalities.  No effusions.  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 They have contacted her family doctor and we are waiting on the result.  --Depending on baseline, may need Nephrology consult       Hyponatremia  -Improved with gentle IV hydration      Leukocytosis, now resolved  UTI  -Awaiting Urine Cx  -Rocephin 1g IV daily   -Blood Cx pending     Hypotension with history of hypertension  -BP stable now, anti-hypertensive's on hold, she likely does not need all of these medications at home  -Continue Metoprolol  -Continue to hold ACE inhibitor, spironolactone, HCTZ and amlodipine     CHF  -Echo pending  - Normally controlled with furosemide and Spironolactone, will hold for now given PRETTY  -Daily weight     Diabetes mellitus 2  - FSBG before meals at bedtime  -SS insulin  - Hemoglobin A1c 10.4  -Consult Diabetes Educator      Hyperlipidemia  - Controlled with atorvastatin  -Lipid panel reviewed, LDL at goal     Mild dementia  -Continue donepezil     Depression  - Controlled with BuSpar and Celexa, continue home dose     Abrasions on lower ext  - Rocephin and Bactroban   -Wound care consult       DVT prophylaxis: Eliquis    Disposition: I expect the patient to be discharged home once medically stable    CODE STATUS:   Code Status and Medical " Interventions:   Ordered at: 10/22/19 2106     Code Status:    CPR     Medical Interventions (Level of Support Prior to Arrest):    Full         Electronically signed by Mica King DO, 10/23/19, 1:41 PM.

## 2019-10-23 NOTE — THERAPY EVALUATION
Patient Name: Allyn Dodge  : 1929    MRN: [id number]                              Today's Date: 10/23/2019       Admit Date: 10/22/2019    Visit Dx:     ICD-10-CM ICD-9-CM   1. Atrial flutter with rapid ventricular response (CMS/HCC) I48.92 427.32   2. Acute on chronic renal insufficiency N28.9 593.9    N18.9 585.9   3. History of hypertension Z86.79 V12.59   4. History of dementia Z86.59 V11.8   5. History of congestive heart failure Z86.79 V12.59     Patient Active Problem List   Diagnosis   • Atrial flutter with rapid ventricular response (CMS/HCC)   • Essential hypertension   • CHF (congestive heart failure) (CMS/HCC)   • Type 2 diabetes mellitus (CMS/HCC)   • Dementia (CMS/HCC)   • PRETTY (acute kidney injury) (CMS/formerly [facility])   • Elevated troponin   • Hyponatremia   • Leukocytosis   • Hypotension   • Abrasions of multiple sites     Past Medical History:   Diagnosis Date   • Atrial flutter (CMS/HCC)    • CHF (congestive heart failure) (CMS/HCC)    • Dementia (CMS/HCC)    • Diabetes mellitus (CMS/HCC)    • Hypertension    • MI (myocardial infarction) (CMS/formerly [facility])      Past Surgical History:   Procedure Laterality Date   • APPENDECTOMY     • BACK SURGERY     • CARDIAC SURGERY     • CHOLECYSTECTOMY     • CORONARY ARTERY BYPASS GRAFT      X2   • HYSTERECTOMY     • KNEE SURGERY       General Information     Row Name 10/23/19 1041          PT Evaluation Time/Intention    Document Type  evaluation  -KM     Mode of Treatment  physical therapy  -KM     Row Name 10/23/19 1041          General Information    Patient Profile Reviewed?  yes  -KM     Prior Level of Function  independent:;gait;transfer;bed mobility;ADL's;min assist:;home management reports dtr checks her medication  -KM     Existing Precautions/Restrictions  fall  -KM     Barriers to Rehab  none identified  -KM     Row Name 10/23/19 1041          Relationship/Environment    Lives With  spouse;grandchild(rufina)  -KM     Row Name 10/23/19 1041           Resource/Environmental Concerns    Current Living Arrangements  home/apartment/condo  -KM     Row Name 10/23/19 1041          Home Main Entrance    Number of Stairs, Main Entrance  none  -KM     Row Name 10/23/19 1041          Stairs Within Home, Primary    Number of Stairs, Within Home, Primary  none  -KM     Row Name 10/23/19 1041          Cognitive Assessment/Intervention- PT/OT    Orientation Status (Cognition)  oriented to;person;place  -KM     Row Name 10/23/19 1041          Safety Issues, Functional Mobility    Safety Issues Affecting Function (Mobility)  safety precautions follow-through/compliance  -KM       User Key  (r) = Recorded By, (t) = Taken By, (c) = Cosigned By    Initials Name Provider Type    KM Odalys Torre, PT Physical Therapist        Mobility     Row Name 10/23/19 1042          Bed Mobility Assessment/Treatment    Comment (Bed Mobility)  UIC  -KM     Row Name 10/23/19 1042          Bed-Chair Transfer    Bed-Chair [place] (Transfers)  minimum assist (75% patient effort)  -KM     Row Name 10/23/19 1042          Sit-Stand Transfer    Sit-Stand [place] (Transfers)  minimum assist (75% patient effort)  -KM     Assistive Device (Sit-Stand Transfers)  walker, front-wheeled  -KM     Row Name 10/23/19 1042          Gait/Stairs Assessment/Training    Gait/Stairs Assessment/Training  gait/ambulation independence;gait/ambulation assistive device  -KM     [place] Level (Gait)  minimum assist (75% patient effort)  -KM     Assistive Device (Gait)  walker, front-wheeled  -KM     Distance in Feet (Gait)  1x25, 1x12  -KM     Pattern (Gait)  step-through  -KM     Deviations/Abnormal Patterns (Gait)  taina decreased;gait speed decreased;stride length decreased  -KM     Bilateral Gait Deviations  forward flexed posture  -KM     Comment (Gait/Stairs)  walked chair to toilet, upon gait toilet to chair she c/o dizziness,sat eob for BP, transferred bed to chair  -KM       User Key  (r) =  Recorded By, (t) = Taken By, (c) = Cosigned By    Initials Name Provider Type    Odalys Ma, PT Physical Therapist        Obj/Interventions     Row Name 10/23/19 1045          General ROM    GENERAL ROM COMMENTS  B l/es wfls  -KM     Row Name 10/23/19 1045          MMT (Manual Muscle Testing)    General MMT Comments  B l/es 4/5  -KM     Row Name 10/23/19 1045          Static Sitting Balance    Level of [place] (Unsupported Sitting, Static Balance)  independent  -KM     Sitting Position (Unsupported Sitting, Static Balance)  sitting in chair  -KM     Row Name 10/23/19 1045          Dynamic Sitting Balance    Level of [place], Reaches Outside Midline (Sitting, Dynamic Balance)  supervision  -KM     Sitting Position, Reaches Outside Midline (Sitting, Dynamic Balance)  sitting in chair  -KM     [facility] Name 10/23/19 1045          Static Standing Balance    Level of [place] (Supported Standing, Static Balance)  contact guard assist  -KM     Assistive Device Utilized (Supported Standing, Static Balance)  walker, rolling  -KM     Row Name 10/23/19 1045          Dynamic Standing Balance    Level of [place], Reaches Outside Midline (Standing, Dynamic Balance)  minimal assist, 75% patient effort  -KM     Assistive Device Utilized (Supported Standing, Dynamic Balance)  walker, rolling  -KM     Row Name 10/23/19 1045          Sensory Assessment/Intervention    Sensory General Assessment  no sensation deficits identified  -KM       User Key  (r) = Recorded By, (t) = Taken By, (c) = Cosigned By    Initials Name Provider Type    Odalys Ma, PT Physical Therapist        Goals/Plan     Row Name 10/23/19 1110          Bed Mobility Goal 1 (PT)    Activity/Assistive Device (Bed Mobility Goal 1, PT)  bed mobility activities, all  -KM     [place] Level/Cues Needed (Bed Mobility Goal 1, PT)  independent  -KM     Time Frame (Bed Mobility Goal 1, PT)  10 days  -KM     Row Name 10/23/19 1110           Transfer Goal 1 (PT)    Activity/Assistive Device (Transfer Goal 1, PT)  sit-to-stand/stand-to-sit;bed-to-chair/chair-to-bed;walker, rolling  -KM     [place] Level/Cues Needed (Transfer Goal 1, PT)  independent  -KM     Time Frame (Transfer Goal 1, PT)  10 days  -KM     Row Name 10/23/19 1110          Gait Training Goal 1 (PT)    Activity/Assistive Device (Gait Training Goal 1, PT)  gait (walking locomotion);walker, rolling  -KM     [place] Level (Gait Training Goal 1, PT)  supervision required  -KM     Distance (Gait Goal 1, PT)  100  -KM     Time Frame (Gait Training Goal 1, PT)  10 days  -KM       User Key  (r) = Recorded By, (t) = Taken By, (c) = Cosigned By    Initials Name Provider Type     Odalys Torre, PT Physical Therapist        Clinical Impression     Row Name 10/23/19 1107          Pain Assessment    Additional Documentation  Pain Scale: Numbers Pre/Post-Treatment (Group)  -KM     Row Name 10/23/19 1107          Pain Scale: Numbers Pre/Post-Treatment    Pain Scale: Numbers, Pretreatment  0/10 - no pain  -KM     Pain Scale: Numbers, Post-Treatment  0/10 - no pain  -KM     Row Name 10/23/19 1107          Plan of Care Review    Plan of Care Reviewed With  patient  -KM     Row Name 10/23/19 1107          Physical Therapy Clinical Impression    Patient/Family Goals Statement (PT Clinical Impression)  regain PLOF  -KM     Criteria for Skilled Interventions Met (PT Clinical Impression)  yes  -KM     Rehab Potential (PT Clinical Summary)  good, to achieve stated therapy goals  -KM     Row Name 10/23/19 1107          Vital Signs    Post Systolic BP Rehab  95  -KM     Post Treatment Diastolic BP  65  -KM     Pretreatment Heart Rate (beats/min)  110  -KM     Posttreatment Heart Rate (beats/min)  103  -KM     O2 Delivery Pre Treatment  room air  -KM     Row Name 10/23/19 1107          Positioning and Restraints    Pre-Treatment Position  sitting in chair/recliner  -KM     Post  Treatment Position  chair  -KM     In Chair  notified nsg;reclined;call light within reach;encouraged to call for assist;exit alarm on  -KM       User Key  (r) = Recorded By, (t) = Taken By, (c) = Cosigned By    Initials Name Provider Type    Odalys Ma, PT Physical Therapist        Outcome Measures     Row Name 10/23/19 1115          How much help from another person do you currently need...    Turning from your back to your side while in flat bed without using bedrails?  3  -KM     Moving from lying on back to sitting on the side of a flat bed without bedrails?  3  -KM     Moving to and from a bed to a chair (including a wheelchair)?  3  -KM     Standing up from a chair using your arms (e.g., wheelchair, bedside chair)?  3  -KM     Climbing 3-5 steps with a railing?  2  -KM     To walk in hospital room?  3  -KM     AM-PAC 6 Clicks Score (PT)  17  -KM     Row Name 10/23/19 1115          Functional Assessment    Outcome Measure Options  AM-PAC 6 Clicks Basic Mobility (PT)  -KM       User Key  (r) = Recorded By, (t) = Taken By, (c) = Cosigned By    Initials Name Provider Type    Odalys Ma, PT Physical Therapist        Physical Therapy Education     Title: PT OT SLP Therapies (Done)     Topic: Physical Therapy (Done)     Point: Mobility training (Done)     Learning Progress Summary           Patient NORA Wong VU by THELMA at 10/23/2019 11:11 AM    Comment:  discussed plan of care and d/c planning                   Point: Home exercise program (Done)     Learning Progress Summary           NORA Gallardo VU by THELMA at 10/23/2019 11:11 AM    Comment:  discussed plan of care and d/c planning                   Point: Body mechanics (Done)     Learning Progress Summary           NORA Gallardo VU by THELMA at 10/23/2019 11:11 AM    Comment:  discussed plan of care and d/c planning                   Point: Precautions (Done)     Learning Progress Summary           NORA Gallardo VU by THELMA at  10/23/2019 11:11 AM    Comment:  discussed plan of care and d/c planning                               User Key     Initials Effective Dates Name Provider Type Discipline     06/19/15 -  Odalys Torre, PT Physical Therapist PT              PT Recommendation and Plan  Planned Therapy Interventions (PT Eval): bed mobility training, gait training, transfer training, strengthening  Outcome Summary/Treatment Plan (PT)  Anticipated Discharge Disposition (PT): home with assist, home with home health  Plan of Care Reviewed With: patient  Outcome Summary: PT consult performed. Pt transferred with min assist and ambul 20 ft to bathroom with r wx and min assist. Further gait deferred due to onset of dizziness. Anticipate return home with family assist and HHPT     Time Calculation:   PT Charges     Row Name 10/23/19 1115             Time Calculation    Start Time  1011  -KM      PT Received On  10/23/19  -KM      PT Goal Re-Cert Due Date  11/02/19  -KM         Time Calculation- PT    Total Timed Code Minutes- PT  9 minute(s)  -KM         Timed Charges    06009 - Gait Training Minutes   9  -KM        User Key  (r) = Recorded By, (t) = Taken By, (c) = Cosigned By    Initials Name Provider Type     Odalys Torre, PT Physical Therapist        Therapy Charges for Today     Code Description Service Date Service Provider Modifiers Qty    32878944352 HC GAIT TRAINING EA 15 MIN 10/23/2019 Odalys Torre, PT GP 1    71436114441 HC PT EVAL MOD COMPLEXITY 4 10/23/2019 Odalys Torre, PT GP 1          PT G-Codes  Outcome Measure Options: AM-PAC 6 Clicks Basic Mobility (PT)  AM-PAC 6 Clicks Score (PT): 17    Odalys Torre, PT  10/23/2019

## 2019-10-23 NOTE — H&P
[facility] Medicine Services  HISTORY AND PHYSICAL    Patient Name: Allyn Dodge  : 1929  MRN: [id number]  Primary Care Physician: Miguel Angel Carrillo MD  Date of admission: 10/22/2019      Subjective   Subjective     Chief Complaint:  Weakness, irregular heartbeat, low blood pressure    HPI:  Allyn Dodge is a 89 y.o. female with PMH significant for atrial flutter/fib, CAD s/p CABG x2, DM2, HTN, MI, mild dementia, and CHF who presents to the ED with complaint of weakness, irregular heartbeat, and low blood pressure.  Per her family at bedside, approximately 2 months ago she was found to have concern for weakness and falls with concern of irregular heartbeat.  She follows with [facility], and it did take a couple of weeks for evaluation and studies to occur.  It was determined that she had atrial fibrillation.  Approximately 2 weeks ago she was placed on metoprolol.  Since that time she has been experiencing increased weakness.  Per family at bedside, her granddaughter stays with her to help care for her.  Her granddaughter reported to them that over the last 2 days she is not wanted to get out of bed and has had decreased appetite.  Today, while she was working with physical therapy, she became increasingly lethargic and was found to be hypotensive.  Upon arrival of EMS her blood pressure was 94/34 vomiting presentation to the ED.  She denies any chest pain, shortness of breath or recent illness.  Of note she does have mild dementia with short-term memory loss and is a poor historian for recent medical events.  Family notes that she normally is evaluated at [facility].  Her daughter who is at bedside notes that she mistakenly asked EMS to bring her here thinking that her previous care was here, but would like to establish care with cardiology.  Upon arrival to the ED, she is found to have elevated troponin, as well as concern for PRETTY with mild hyponatremia and continued hypotension.  Mild  leukocytosis is also noted.  She continues to deny chest pain, shortness of breath or urinary symptoms.  CXR is negative for acute findings.  EKG reveals no acute ischemia.  She will be admitted to hospital medicine for further evaluation    Review of Systems   Constitutional: Positive for activity change, appetite change and fatigue. Negative for chills and diaphoresis.   HENT: Negative.    Eyes: Negative for visual disturbance.   Respiratory: Negative for cough, chest tightness, shortness of breath and wheezing.    Cardiovascular: Positive for leg swelling. Negative for chest pain and palpitations.   Gastrointestinal: Negative for abdominal distention, abdominal pain, constipation, diarrhea and nausea.   Genitourinary: Negative for difficulty urinating, dysuria, frequency and urgency.   Musculoskeletal: Positive for gait problem. Negative for arthralgias and myalgias.   Skin: Positive for wound (c/o itching of lower ext ). Negative for color change and pallor.   Neurological: Positive for weakness. Negative for dizziness, speech difficulty, numbness and headaches.   Psychiatric/Behavioral: Positive for confusion. The patient is not nervous/anxious.         All other systems reviewed and are negative.     Personal History     Past Medical History:   Diagnosis Date   • Atrial flutter (CMS/HCC)    • CHF (congestive heart failure) (CMS/HCC)    • Dementia (CMS/HCC)    • Diabetes mellitus (CMS/HCC)    • Hypertension    • MI (myocardial infarction) (CMS/HCC)        Past Surgical History:   Procedure Laterality Date   • APPENDECTOMY     • BACK SURGERY     • CARDIAC SURGERY     • CHOLECYSTECTOMY     • CORONARY ARTERY BYPASS GRAFT      X2   • HYSTERECTOMY     • KNEE SURGERY         Family History: family history includes Diabetes in her father; Hypertension in her mother; Peripheral vascular disease in her father. Otherwise pertinent FHx was reviewed and unremarkable.     Social History:  reports that she quit smoking about  31 years ago. Her smoking use included cigarettes. She smoked 1.50 packs per day. She has never used smokeless tobacco. She reports that she does not drink alcohol or use drugs.  Social History     Social History Narrative   • Not on file       Medications:    Available home medication information reviewed.  Medications Prior to Admission   Medication Sig Dispense Refill Last Dose   • amLODIPine (NORVASC) 5 MG tablet Take 5 mg by mouth Daily.   10/22/2019 at Unknown time   • atorvastatin (LIPITOR) 10 MG tablet Take 10 mg by mouth Every Night.   10/21/2019 at Unknown time   • busPIRone (BUSPAR) 15 MG tablet Take 15 mg by mouth 2 (Two) Times a Day.   10/22/2019 at Unknown time   • citalopram (CeleXA) 20 MG tablet Take 20 mg by mouth Daily.   10/22/2019 at Unknown time   • clopidogrel (PLAVIX) 75 MG tablet Take 75 mg by mouth Daily.   10/22/2019 at Unknown time   • donepezil (ARICEPT) 10 MG tablet Take 10 mg by mouth Every Night.   10/21/2019 at Unknown time   • furosemide (LASIX) 20 MG tablet Take 20 mg by mouth Daily.   10/22/2019 at Unknown time   • glimepiride (AMARYL) 2 MG tablet Take 2 mg by mouth Every Morning Before Breakfast.   10/22/2019 at Unknown time   • hydroCHLOROthiazide (HYDRODIURIL) 25 MG tablet Take 25 mg by mouth Daily.   10/22/2019 at Unknown time   • levocetirizine (XYZAL) 2.5 MG/5ML solution Take 5 mg by mouth Every Evening.   10/21/2019 at Unknown time   • lisinopril (PRINIVIL,ZESTRIL) 40 MG tablet Take 40 mg by mouth Daily.   10/22/2019 at Unknown time   • metoprolol tartrate (LOPRESSOR) 25 MG tablet Take 25 mg by mouth 2 (Two) Times a Day.   10/22/2019 at Unknown time   • oxybutynin (DITROPAN) 5 MG tablet Take 5 mg by mouth Daily.   10/22/2019 at Unknown time   • spironolactone (ALDACTONE) 25 MG tablet Take 25 mg by mouth Daily.   10/22/2019 at Unknown time   • GABAPENTIN PO Take  by mouth.   Unknown at Unknown time   • HYDROXYZINE HCL PO Take  by mouth.   Unknown at Unknown time   •  predniSONE (DELTASONE) 20 MG tablet Take 20 mg by mouth Daily.   Unknown at Unknown time   • QUETIAPINE FUMARATE PO Take  by mouth.   Unknown at Unknown time       Allergies   Allergen Reactions   • Codeine Other (See Comments)     UNKNOWN   • Penicillins Other (See Comments)     UNKNOWN       Objective   Objective     Vital Signs:   Temp:  [99.2 °F (37.3 °C)] 99.2 °F (37.3 °C)  Heart Rate:  [102-106] 106  Resp:  [20] 20  BP: ()/(48-80) 107/80        Physical Exam   Constitutional: She appears well-developed and well-nourished. No distress.   HENT:   Head: Normocephalic and atraumatic.   Eyes: Pupils are equal, round, and reactive to light.   Neck: Normal range of motion. Neck supple. No JVD present.   Cardiovascular: Normal rate, normal heart sounds and intact distal pulses. An irregularly irregular rhythm present. Exam reveals no gallop and no friction rub.   No murmur heard.  Pulmonary/Chest: Effort normal and breath sounds normal. No respiratory distress. She has no wheezes. She has no rales.   Abdominal: Soft. Bowel sounds are normal. She exhibits no distension and no mass. There is no tenderness. There is no guarding.   Musculoskeletal: Normal range of motion. She exhibits no edema or tenderness.   Neurological: She is alert.   Oriented to self and family at bedside.  Knows that she is at the hospital but thought she was at [facility].  Disoriented to time and situation   Skin: Skin is warm and dry. No erythema. No pallor.   excortication of anterior tibial region  Errythema under breasts   Psychiatric: She has a normal mood and affect. Her behavior is normal.   Vitals reviewed.       Results Reviewed:  I have personally reviewed current lab and radiology data.    Results from last 7 days   Lab Units 10/22/19  1702   WBC 10*3/mm3 14.92*   HEMOGLOBIN g/dL 12.0   HEMATOCRIT % 38.4   PLATELETS 10*3/mm3 328     Results from last 7 days   Lab Units 10/22/19  1951 10/22/19  1702   SODIUM mmol/L  --  130*    POTASSIUM mmol/L  --  4.1   CHLORIDE mmol/L  --  93*   CO2 mmol/L  --  22.0   BUN mg/dL  --  90*   CREATININE mg/dL  --  3.66*   GLUCOSE mg/dL  --  231*   CALCIUM mg/dL  --  9.3   ALT (SGPT) U/L  --  10   AST (SGOT) U/L  --  11   TROPONIN T ng/mL 0.024 0.035*   PROBNP pg/mL  --  945.1     Estimated Creatinine Clearance: 10.9 mL/min (A) (by C-G formula based on SCr of 3.66 mg/dL (H)).  Brief Urine Lab Results     None        Imaging Results (last 24 hours)     Procedure Component Value Units Date/Time    XR Chest 1 View [[id number]] Collected:  10/22/19 1646     Updated:  10/22/19 1744    Narrative:       EXAMINATION: XR CHEST 1 VW-10/22/2019:      INDICATION: Weak/Dizzy/AMS triage protocol.      COMPARISON: NONE.     FINDINGS: The heart is at the upper limits of normal. There are  postoperative cardiac changes. The heart is compensated. There is no  acute pulmonary process. There is no mass or effusion.           Impression:       Chronic pulmonary findings. There are no acute  abnormalities.     D:  10/22/2019  E:  10/22/2019     This report was finalized on 10/22/2019 5:41 PM by Dr. Honorio Rios MD.                Assessment/Plan   Assessment / Plan     Active Hospital Problems    Diagnosis POA   • **Atrial flutter with rapid ventricular response (CMS/HCC) [I48.92] Yes   • Essential hypertension [I10] Yes   • CHF (congestive heart failure) (CMS/HCC) [I50.9] Yes   • Type 2 diabetes mellitus (CMS/HCC) [E11.9] Yes   • Dementia (CMS/HCC) [F03.90] Yes   • PRETTY (acute kidney injury) (CMS/HCC) [N17.9] Yes   • Elevated troponin [R79.89] Yes   • Hyponatremia [E87.1] Yes   • Leukocytosis [D72.829] Yes   • Hypotension [I95.9] Yes     89-year-old female presenting to the ED with complaint of weakness, irregular heartbeat, and low blood pressure who was found to have concern for atrial flutter with RVR and PRETTY with hyponatremia and leukocytosis.    Atrial flutter with RVR  - Request records from [facility]  - Family notes that she  has not followed along with cardiology since her CABG approximately 7 to 8 years ago  - Not anticoagulated, risk VS benefit given age and instability  - Currently rate is controlled, will hold metoprolol for now given hypotension  -Cardiology consult in the a.m.  -Echo in the a.m.  -CBC, BMP in the a.m.    PRETTY  -Unclear baseline  -Request records from [facility]  - Suspect this is secondary to mild dehydration with continued use of diuretic and diabetes medication  - Urine studies  -UA pending  -Gentle IVF overnight  -BMP in the a.m.    Elevated troponin  - Second set shows improvement  -Continue to trend  -Trend EKG  -  mg given in the ED, will continue daily dose  -Cardiology consult in the a.m.    Hyponatremia  - Suspect this is secondary to decreased p.o. intake  -Serum osmolality  -Urine osmolality  -Urine electrolytes  -Gentle IVF for now    Leukocytosis  - Low-grade fever noted in the ED  -CXR negative for acute findings  -UA pending  -CBC in the a.m.    Hypotension with history of hypertension  - Hold antihypertensives for now  -Gentle IVF overnight  -Currently improved since arrival    CHF  -Echo in the a.m.  - Normally controlled with furosemide and Spironolactone, will hold for now given PRETTY  -Daily weight    Diabetes mellitus 2  - FSBG before meals at bedtime  -SS insulin  - Hemoglobin A1c in the a.m.    History of CAD  - CABG x2 at [facility]  -Does not follow with cardiology  - Continue Plavix    Hyperlipidemia  - Controlled with atorvastatin  -Lipid panel in a.m.    Mild dementia  -Continue donepezil    Depression  - Controlled with BuSpar and Celexa, continue home dose    Abrasions on lower ext  - Rocephin and Bactroban and wound care consult       DVT prophylaxis: Heparin    CODE STATUS:    Code Status and Medical Interventions:   Ordered at: 10/22/19 2106     Code Status:    CPR     Medical Interventions (Level of Support Prior to Arrest):    Full       Admission Status:  I believe this patient  meets INPATIENT status due to new onset atrial flutter.  I feel patient’s risk for adverse outcomes and need for care warrant INPATIENT evaluation and I predict the patient’s care encounter to likely last beyond 2 midnights.      Electronically signed by MAUREEN Durham, 10/22/19, 10:14 PM.        Brief Attending Admission Attestation     I have seen and examined the patient, performing an independent face-to-face diagnostic evaluation with plan of care reviewed and developed with the advanced practice clinician (APC).      Brief Summary Statement:   Allyn Dodeg is a 89 y.o. female with Hx of CHF, dementia, T2DM, and recent dx of atrial fib and atrial flutter. Pt was started on beta blocker 2 weeks ago and became lethargic and hypotensive. She had decreased appetite and family reports decreased PO in take for the past 2 days. Pt GD advised that pt had not wanted to get out of bed for the past 2 days. When pt was working with PT she became lethargic and hypotensive and was sent to [facility] ED. She was noted to have an increased trop in the ED and hypotension, PRETTY and atrial flutter. Pt will be admitted to telemetry for further evaluation and management.       Remainder of detailed HPI is as noted above and has been reviewed and/or edited by me for completeness.      Attending Physical Exam:  Constitutional: No acute distress, awake, alert  HENT: NCAT, mucous membranes moist  Respiratory: Clear to auscultation bilaterally, respiratory effort normal   Cardiovascular: RRR, no murmurs, rubs, or gallops, palpable pedal pulses bilaterally  Gastrointestinal: Positive bowel sounds, soft, nontender, nondistended  Musculoskeletal: No bilateral ankle edema  Psychiatric: Appropriate affect, cooperative  Neurologic: Oriented x 3, strength symmetric in all extremities, Cranial Nerves grossly intact to confrontation, speech clear  Skin: excoriation and abrasions in anterior tibial region         Brief Assessment/Plan :  See above  for further detailed assessment and plan developed with APC which I have reviewed and/or edited for completeness.      Electronically signed by Alona Méndez DO, 10/23/19, 5:09 AM.

## 2019-10-23 NOTE — CONSULTS
"[facility]  Consultation History and Physical  Allyn Dodge  9/22/1929      PCP:   Miguel Angel Carrillo MD        Date of  Consultation: 10/23/2019 10:07 AM     Reason for Consultation: CAD,  Atrial Flutter    Problem List:   1.  CAD with history of CABG x2, patient reports 7 to 8 years ago, data deficit  2.  Atrial fibrillation/flutter  3.  Mild dementia, lives alone  4.  Chronic kidney disease, unknown baseline  5.  Reported CHF  6.  DM  7.  HTN    History of Present Illness:  Allyn Dodge  Is a 89 y.o. female with medical history detailed above.  She had her bypass surgery at [facility] about 7 to 8 years ago but has not followed with cardiology on a routine basis.  She states for about the last month she is felt more generally weak at home.  She denies any falls or injuries.  She denies any specific pain and denies shortness of breath.  In review of the records it looks like she was diagnosed with atrial fibrillation within the last few weeks by part of the [facility] team.  Her EKGs and telemetry here show a predominantly rate controlled atrial flutter.  She is on some oral metoprolol.  She has not been on full anticoagulation but she is on Plavix, presumably for her history of coronary disease.  Yesterday she was working with physical therapy and was more weak and \"puny\" in her words and was also found to have a low blood pressure reported at 94/34 mmHg.  Pertinent results from the ER and overnight include an initial troponin of 0.035 which down trended to 0.024.  Her proBNP for her age.  Her creatinine was elevated at 3.66 with a GFR of 12 again unknown baseline.  Blood counts within normal limits chest x-ray does not show any acute abnormalities no significant volume overload.  Echocardiogram performed and pending.  Currently patient normotensive, in rate controlled atrial flutter, in no distress      Patient Active Problem List    Diagnosis Date Noted   • *Atrial flutter with " rapid ventricular response (CMS/HCC) 10/22/2019   • Abrasions of multiple sites 10/23/2019   • Essential hypertension 10/22/2019   • CHF (congestive heart failure) (CMS/HCC) 10/22/2019   • Type 2 diabetes mellitus (CMS/HCC) 10/22/2019   • Dementia (CMS/HCC) 10/22/2019   • PRETTY (acute kidney injury) (CMS/HCC) 10/22/2019   • Elevated troponin 10/22/2019   • Hyponatremia 10/22/2019   • Leukocytosis 10/22/2019   • Hypotension 10/22/2019       Allergies   Allergen Reactions   • Codeine Other (See Comments)     UNKNOWN   • Penicillins Other (See Comments)     UNKNOWN       Social History     Socioeconomic History   • Marital status:      Spouse name: Not on file   • Number of children: Not on file   • Years of education: Not on file   • Highest education level: Not on file   Tobacco Use   • Smoking status: Former Smoker     Packs/day: 1.50     Types: Cigarettes     Last attempt to quit: 1988     Years since quittin.8   • Smokeless tobacco: Never Used   Substance and Sexual Activity   • Alcohol use: No     Frequency: Never   • Drug use: No   • Sexual activity: Defer   Social History Narrative    8 adult children, still lives in [place] with her         Family History   Problem Relation Age of Onset   • Hypertension Mother    • Diabetes Father    • Peripheral vascular disease Father        Current Medications:    Current Facility-Administered Medications:   •  acetaminophen (TYLENOL) tablet 650 mg, 650 mg, Oral, Q4H PRN **OR** acetaminophen (TYLENOL) 160 MG/5ML solution 650 mg, 650 mg, Oral, Q4H PRN **OR** acetaminophen (TYLENOL) suppository 650 mg, 650 mg, Rectal, Q4H PRN, Josefina Noble, APRN  •  atorvastatin (LIPITOR) tablet 10 mg, 10 mg, Oral, Nightly, Josefina Noble, APRN, 10 mg at 10/22/19 5964  •  busPIRone (BUSPAR) tablet 15 mg, 15 mg, Oral, BID, Josefina Noble, APRN, 15 mg at 10/23/19 1607  •  cefTRIAXone (ROCEPHIN) 1 g/100 mL 0.9% NS (MBP), 1 g, Intravenous, Nightly, Alona Méndez,  DO, Stopped at 10/23/19 0300  •  citalopram (CeleXA) tablet 20 mg, 20 mg, Oral, Daily, Josefina Noble APRN, 20 mg at 10/23/19 0852  •  clopidogrel (PLAVIX) tablet 75 mg, 75 mg, Oral, Daily, Josefina Noble APRN, 75 mg at 10/23/19 0852  •  dextrose (D50W) 25 g/ 50mL Intravenous Solution 25 g, 25 g, Intravenous, Q15 Min PRN, Josefina Noble APRN  •  dextrose (GLUTOSE) oral gel 15 g, 15 g, Oral, Q15 Min PRN, Josefina Noble APRN  •  donepezil (ARICEPT) tablet 10 mg, 10 mg, Oral, Nightly, Josefina Noble APRN, 10 mg at 10/22/19 2344  •  glucagon (human recombinant) (GLUCAGEN DIAGNOSTIC) injection 1 mg, 1 mg, Subcutaneous, Q15 Min PRN, Josefina Noble APRN  •  heparin (porcine) 5000 UNIT/ML injection 5,000 Units, 5,000 Units, Subcutaneous, Q8H, Josefina Noble APRN, 5,000 Units at 10/23/19 0530  •  insulin lispro (humaLOG) injection 0-7 Units, 0-7 Units, Subcutaneous, 4x Daily With Meals & Nightly, Josefina Noble APRN, Stopped at 10/23/19 0740  •  mupirocin (BACTROBAN) 2 % ointment, , Topical, Q12H, Alona Méndez, DO  •  nystatin (MYCOSTATIN) powder, , Topical, Q12H, Alona Méndez, DO  •  oxybutynin (DITROPAN) tablet 5 mg, 5 mg, Oral, Daily, Josefina Noble APRN, 5 mg at 10/23/19 0852  •  sodium chloride 0.9 % flush 10 mL, 10 mL, Intravenous, PRN, Erich Redman MD  •  sodium chloride 0.9 % flush 10 mL, 10 mL, Intravenous, Q12H, Josefina Noble APRN, 10 mL at 10/23/19 0854  •  sodium chloride 0.9 % flush 10 mL, 10 mL, Intravenous, PRN, Josefina Noble R, APRN     Review of Systems   Constitution: Positive for weakness and malaise/fatigue.   Cardiovascular: Negative for chest pain, claudication, dyspnea on exertion, irregular heartbeat, orthopnea and palpitations.   Respiratory: Negative for cough.    Gastrointestinal: Negative for abdominal pain.   Neurological: Negative for headaches.   All other systems reviewed and are negative.      OBJECTIVE:  Vitals:    10/22/19 1906 10/23/19 0445  10/23/19 0500 10/23/19 0729   BP:  102/82  117/69   BP Location:  Right arm  Right arm   Patient Position:  Lying  Lying   Pulse:  84  95   Resp: 20 18  16   Temp:  97.7 °F (36.5 °C)  97.9 °F (36.6 °C)   TempSrc:  Oral  Oral   SpO2: 95%      Weight:   82.8 kg (182 lb 8 oz)    Height:         I/O last 3 completed shifts:  In: 1372 [I.V.:1272; IV Piggyback:100]  Out: -   I/O this shift:  In: -   Out: 175 [Urine:175]  Intake & Output (last 3 days)       10/20 0701 - 10/21 0700 10/21 0701 - 10/22 0700 10/22 0701 - 10/23 0700 10/23 0701 - 10/24 0700    I.V. (mL/kg)   1272 (15.4)     IV Piggyback   100     Total Intake(mL/kg)   1372 (16.6)     Urine (mL/kg/hr)    175 (0.7)    Total Output    175    Net   +1372 -175                     Physical Exam   Constitutional: She appears well-developed and well-nourished.   HENT:   Head: Normocephalic and atraumatic.   Eyes: EOM are normal.   Neck: Neck supple.   No bruits   Cardiovascular: Normal rate, S1 normal and S2 normal. An irregularly irregular rhythm present.   No murmur heard.  Pulmonary/Chest: Effort normal. She has no wheezes. She has no rales.   Abdominal: Soft. There is no tenderness.   Musculoskeletal: She exhibits edema (trace edema).   Skin:   Some scattered scrapes/bruises       Diagnostic Data:  Lab Results (last 24 hours)     Procedure Component Value Units Date/Time    POC Glucose Once [[id number]]  (Abnormal) Collected:  10/23/19 0728    Specimen:  Blood Updated:  10/23/19 0731     Glucose 133 mg/dL     Creatinine, Urine, Random - Urine, Clean Catch [[id number]] Collected:  10/22/19 2353    Specimen:  Urine, Clean Catch Updated:  10/23/19 0641    Basic Metabolic Panel [[id number]]  (Abnormal) Collected:  10/23/19 0202    Specimen:  Blood Updated:  10/23/19 0315     Glucose 174 mg/dL      BUN 87 mg/dL      Creatinine 3.42 mg/dL      Sodium 133 mmol/L      Potassium 4.1 mmol/L      Chloride 94 mmol/L      CO2 24.0 mmol/L      Calcium 9.2 mg/dL      eGFR  African  Amer --     Comment: <15 Indicative of kidney failure.        eGFR Non African Amer 13 mL/min/1.73      Comment: <15 Indicative of kidney failure.        BUN/Creatinine Ratio 25.4     Anion Gap 15.0 mmol/L     Narrative:       GFR Normal >60  Chronic Kidney Disease <60  Kidney Failure <15    Lipid Panel [[id number]] Collected:  10/23/19 0202    Specimen:  Blood Updated:  10/23/19 0315     Total Cholesterol 132 mg/dL      Triglycerides 94 mg/dL      HDL Cholesterol 45 mg/dL      LDL Cholesterol  68 mg/dL      VLDL Cholesterol 18.8 mg/dL      LDL/HDL Ratio 1.52    Narrative:       Cholesterol Reference Ranges  (U.S. Department of Health and Human Services ATP III Classifications)    Desirable          <200 mg/dL  Borderline High    200-239 mg/dL  High Risk          >240 mg/dL      Triglyceride Reference Ranges  (U.S. Department of Health and Human Services ATP III Classifications)    Normal           <150 mg/dL  Borderline High  150-199 mg/dL  High             200-499 mg/dL  Very High        >500 mg/dL    HDL Reference Ranges  (U.S. Department of Health and Human Services ATP III Classifcations)    Low     <40 mg/dl (major risk factor for CHD)  High    >60 mg/dl ('negative' risk factor for CHD)        LDL Reference Ranges  (U.S. Department of Health and Human Services ATP III Classifcations)    Optimal          <100 mg/dL  Near Optimal     100-129 mg/dL  Borderline High  130-159 mg/dL  High             160-189 mg/dL  Very High        >189 mg/dL    Hemoglobin A1c [[id number]]  (Abnormal) Collected:  10/23/19 0202    Specimen:  Blood Updated:  10/23/19 0314     Hemoglobin A1C 10.40 %     Narrative:       Hemoglobin A1C Ranges:    Increased Risk for Diabetes  5.7% to 6.4%  Diabetes                     >= 6.5%  Diabetic Goal                < 7.0%    Lactic Acid, Plasma [[id number]]  (Normal) Collected:  10/23/19 0202    Specimen:  Blood Updated:  10/23/19 0313     Lactate 1.4 mmol/L      Comment: Falsely depressed results  may occur on samples drawn from patients receiving N-Acetylcysteine (NAC) or Metamizole.       CBC Auto Differential [[id number]]  (Abnormal) Collected:  10/23/19 0202    Specimen:  Blood Updated:  10/23/19 0300     WBC 8.74 10*3/mm3      RBC 4.14 10*6/mm3      Hemoglobin 11.0 g/dL      Hematocrit 35.0 %      MCV 84.5 fL      MCH 26.6 pg      MCHC 31.4 g/dL      RDW 14.3 %      RDW-SD 43.9 fl      MPV 9.9 fL      Platelets 302 10*3/mm3      Neutrophil % 82.7 %      Lymphocyte % 11.2 %      Monocyte % 5.5 %      Eosinophil % 0.0 %      Basophil % 0.0 %      Immature Grans % 0.6 %      Neutrophils, Absolute 7.23 10*3/mm3      Lymphocytes, Absolute 0.98 10*3/mm3      Monocytes, Absolute 0.48 10*3/mm3      Eosinophils, Absolute 0.00 10*3/mm3      Basophils, Absolute 0.00 10*3/mm3      Immature Grans, Absolute 0.05 10*3/mm3      nRBC 0.0 /100 WBC     Blood Culture - Blood, Arm, Right [[id number]] Collected:  10/23/19 0202    Specimen:  Blood from Arm, Right Updated:  10/23/19 0259    Blood Culture - Blood, Arm, Left [[id number]] Collected:  10/23/19 0202    Specimen:  Blood from Arm, Left Updated:  10/23/19 0259    Urinalysis With Microscopic If Indicated (No Culture) - Urine, Catheter [[id number]]  (Abnormal) Collected:  10/23/19 0030    Specimen:  Urine, Catheter Updated:  10/23/19 0146     Color, UA Yellow     Appearance, UA Clear     pH, UA <=5.0     Specific Gravity, UA 1.015     Glucose, UA Negative     Ketones, UA Negative     Bilirubin, UA Negative     Blood, UA Negative     Protein, UA Negative     Leuk Esterase, UA Moderate (2+)     Nitrite, UA Negative     Urobilinogen, UA 0.2 E.U./dL    Urinalysis, Microscopic Only - Urine, Catheter [[id number]]  (Abnormal) Collected:  10/23/19 0030    Specimen:  Urine, Catheter Updated:  10/23/19 0146     RBC, UA 0-2 /HPF      WBC, UA 31-50 /HPF      Bacteria, UA 2+ /HPF      Squamous Epithelial Cells, UA None Seen /HPF      Hyaline Casts, UA 0-6 /LPF      Methodology Automated  "Microscopy    Sodium, Urine, Random - Urine, Clean Catch [[id number]] Collected:  10/22/19 2353    Specimen:  Urine, Clean Catch Updated:  10/23/19 0119     Sodium, Urine 89 mmol/L     Narrative:       Reference intervals for random urine have not been established.  Clinical usage is dependent upon physician's interpretation in combination with other laboratory tests.     Protein, Urine, Random - Urine, Clean Catch [[id number]] Collected:  10/22/19 2353    Specimen:  Urine, Clean Catch Updated:  10/23/19 0119     Total Protein, Urine 11.3 mg/dL     Narrative:       Reference intervals for random urine have not been established.  Clinical usage is dependent upon physician's interpretation in combination with other laboratory tests.     Procalcitonin [[id number]]  (Abnormal) Collected:  10/23/19 0018    Specimen:  Blood Updated:  10/23/19 0116     Procalcitonin 0.09 ng/mL     Narrative:       As a Marker for Sepsis (Non-Neonates):   1. <0.5 ng/mL represents a low risk of severe sepsis and/or septic shock.  1. >2 ng/mL represents a high risk of severe sepsis and/or septic shock.    As a Marker for Lower Respiratory Tract Infections that require antibiotic therapy:  PCT on Admission     Antibiotic Therapy             6-12 Hrs later  > 0.5                Strongly Recommended            >0.25 - <0.5         Recommended  0.1 - 0.25           Discouraged                   Remeasure/reassess PCT  <0.1                 Strongly Discouraged          Remeasure/reassess PCT      As 28 day mortality risk marker: \"Change in Procalcitonin Result\" (> 80 % or <=80 %) if Day 0 (or Day 1) and Day 4 values are available. Refer to http://www.[facility]-pct-calculator.com/   Change in PCT <=80 %   A decrease of PCT levels below or equal to 80 % defines a positive change in PCT test result representing a higher risk for 28-day all-cause mortality of patients diagnosed with severe sepsis or septic shock.  Change in PCT > 80 %   A decrease of PCT " levels of more than 80 % defines a negative change in PCT result representing a lower risk for 28-day all-cause mortality of patients diagnosed with severe sepsis or septic shock.                Troponin [[id number]]  (Normal) Collected:  10/23/19 0018    Specimen:  Blood Updated:  10/23/19 0115     Troponin T 0.025 ng/mL     Narrative:       Troponin T Reference Range:  <= 0.03 ng/mL-   Negative for AMI  >0.03 ng/mL-     Abnormal for myocardial necrosis.  Clinicians would have to utilize clinical acumen, EKG, Troponin and serial changes to determine if it is an Acute Myocardial Infarction or myocardial injury due to an underlying chronic condition.     TSH [[id number]]  (Abnormal) Collected:  10/23/19 0018    Specimen:  Blood Updated:  10/23/19 0115     TSH 0.142 uIU/mL     Narrative:       Due to abnormal TSH results, suggest ordering Free T4.    Osmolality, Urine - Urine, Clean Catch [[id number]]  (Normal) Collected:  10/22/19 2353    Specimen:  Urine, Clean Catch Updated:  10/23/19 0109     Osmolality, Urine 377 mOsm/kg     Osmolality, Serum [[id number]]  (Abnormal) Collected:  10/23/19 0018    Specimen:  Blood Updated:  10/23/19 0109     Osmolality 316 mOsm/kg     Phosphorus [[id number]]  (Abnormal) Collected:  10/23/19 0018    Specimen:  Blood Updated:  10/23/19 0107     Phosphorus 5.4 mg/dL     POC Glucose Once [[id number]]  (Abnormal) Collected:  10/22/19 2346    Specimen:  Blood Updated:  10/22/19 2352     Glucose 242 mg/dL     Troponin [[id number]]  (Normal) Collected:  10/22/19 1951    Specimen:  Blood Updated:  10/22/19 2025     Troponin T 0.024 ng/mL     Narrative:       Troponin T Reference Range:  <= 0.03 ng/mL-   Negative for AMI  >0.03 ng/mL-     Abnormal for myocardial necrosis.  Clinicians would have to utilize clinical acumen, EKG, Troponin and serial changes to determine if it is an Acute Myocardial Infarction or myocardial injury due to an underlying chronic condition.     [place] Draw [[id number]]  Collected:  10/22/19 1702    Specimen:  Blood Updated:  10/22/19 1847    Narrative:       The following orders were created for panel order [place] Draw.  Procedure                               Abnormality         Status                     ---------                               -----------         ------                     Light Blue Top[[id number]]                                   Final result               Green Top (Gel)[[id number]]                                  Final result               Lavender Top[[id number]]                                     Final result               Gold Top - SST[[id number]]                                   Final result               Green Top (No Gel)[[id number]]                                                            Please view results for these tests on the individual orders.    Light Blue Top [[id number]] Collected:  10/22/19 1702    Specimen:  Blood Updated:  10/22/19 1815     Extra Tube hold for add-on     Comment: Auto resulted       Green Top (Gel) [[id number]] Collected:  10/22/19 1702    Specimen:  Blood Updated:  10/22/19 1815     Extra Tube Hold for add-ons.     Comment: Auto resulted.       Lavender Top [[id number]] Collected:  10/22/19 1702    Specimen:  Blood Updated:  10/22/19 1815     Extra Tube hold for add-on     Comment: Auto resulted       Gold Top - SST [[id number]] Collected:  10/22/19 1702    Specimen:  Blood Updated:  10/22/19 1815     Extra Tube Hold for add-ons.     Comment: Auto resulted.       Troponin [[id number]]  (Abnormal) Collected:  10/22/19 1702    Specimen:  Blood Updated:  10/22/19 1745     Troponin T 0.035 ng/mL     Narrative:       Troponin T Reference Range:  <= 0.03 ng/mL-   Negative for AMI  >0.03 ng/mL-     Abnormal for myocardial necrosis.  Clinicians would have to utilize clinical acumen, EKG, Troponin and serial changes to determine if it is an Acute Myocardial Infarction or myocardial injury due to an underlying chronic condition.      Comprehensive Metabolic Panel [[id number]]  (Abnormal) Collected:  10/22/19 1702    Specimen:  Blood Updated:  10/22/19 1743     Glucose 231 mg/dL      BUN 90 mg/dL      Creatinine 3.66 mg/dL      Sodium 130 mmol/L      Potassium 4.1 mmol/L      Chloride 93 mmol/L      CO2 22.0 mmol/L      Calcium 9.3 mg/dL      Total Protein 6.8 g/dL      Albumin 4.00 g/dL      ALT (SGPT) 10 U/L      AST (SGOT) 11 U/L      Alkaline Phosphatase 48 U/L      Total Bilirubin 0.5 mg/dL      eGFR Non African Amer 12 mL/min/1.73      Comment: <15 Indicative of kidney failure.        eGFR   Amer --     Comment: <15 Indicative of kidney failure.        Globulin 2.8 gm/dL      A/G Ratio 1.4 g/dL      BUN/Creatinine Ratio 24.6     Anion Gap 15.0 mmol/L     Narrative:       GFR Normal >60  Chronic Kidney Disease <60  Kidney Failure <15    Magnesium [[id number]]  (Normal) Collected:  10/22/19 1702    Specimen:  Blood Updated:  10/22/19 1743     Magnesium 2.0 mg/dL     BNP [[id number]]  (Normal) Collected:  10/22/19 1702    Specimen:  Blood Updated:  10/22/19 1740     proBNP 945.1 pg/mL     Narrative:       Among patients with dyspnea, NT-proBNP is highly sensitive for the detection of acute congestive heart failure. In addition NT-proBNP of <300 pg/ml effectively rules out acute congestive heart failure with 99% negative predictive value.    CBC & Differential [[id number]] Collected:  10/22/19 1702    Specimen:  Blood Updated:  10/22/19 1720    Narrative:       The following orders were created for panel order CBC & Differential.  Procedure                               Abnormality         Status                     ---------                               -----------         ------                     CBC Auto Differential[[id number]]        Abnormal            Final result                 Please view results for these tests on the individual orders.    CBC Auto Differential [[id number]]  (Abnormal) Collected:  10/22/19 1702    Specimen:   Blood Updated:  10/22/19 1720     WBC 14.92 10*3/mm3      RBC 4.50 10*6/mm3      Hemoglobin 12.0 g/dL      Hematocrit 38.4 %      MCV 85.3 fL      MCH 26.7 pg      MCHC 31.3 g/dL      RDW 14.5 %      RDW-SD 44.8 fl      MPV 9.8 fL      Platelets 328 10*3/mm3      Neutrophil % 83.7 %      Lymphocyte % 9.4 %      Monocyte % 5.8 %      Eosinophil % 0.4 %      Basophil % 0.1 %      Immature Grans % 0.6 %      Neutrophils, Absolute 12.49 10*3/mm3      Lymphocytes, Absolute 1.40 10*3/mm3      Monocytes, Absolute 0.86 10*3/mm3      Eosinophils, Absolute 0.06 10*3/mm3      Basophils, Absolute 0.02 10*3/mm3      Immature Grans, Absolute 0.09 10*3/mm3      nRBC 0.0 /100 WBC         ECG/EMG Results (last 24 hours)     Procedure Component Value Units Date/Time    ECG 12 Lead [[id number]] Collected:  10/22/19 1611     Updated:  10/22/19 1647    Narrative:       Test Reason : Weak/Dizzy/AMS protocol  Blood Pressure : **/** mmHG  Vent. Rate : 096 BPM     Atrial Rate : 250 BPM     P-R Int : 000 ms          QRS Dur : 092 ms      QT Int : 352 ms       P-R-T Axes : 000 064 -55 degrees     QTc Int : 444 ms    Atrial flutter with variable AV block  ST & T wave abnormality, consider inferior ischemia  Abnormal ECG  No previous ECGs available  Confirmed by CRUZITO PIERRE MD ([id number]) on 10/22/2019 4:47:50 PM    Referred By:  EDMD           Confirmed By:CRUZITO PIERRE MD    ECG 12 Lead [[id number]] Collected:  10/22/19 2201     Updated:  10/23/19 0646    Narrative:       Test Reason : elevated troponin  Blood Pressure : **/** mmHG  Vent. Rate : 094 BPM     Atrial Rate : 234 BPM     P-R Int : 000 ms          QRS Dur : 094 ms      QT Int : 372 ms       P-R-T Axes : -30 144 -27 degrees     QTc Int : 465 ms    Atrial flutter with variable AV block with premature ventricular or  aberrantly conducted complexes  Left posterior fascicular block  Possible Inferior infarct , age undetermined  Abnormal ECG  When compared with ECG of 22-OCT-2019  16:11,  Borderline criteria for Inferior infarct are now present  T wave inversion less evident in Inferior leads    Referred By:  MICHELLE           Confirmed By:     ECG 12 Lead [[id number]] Collected:  10/22/19 2202     Updated:  10/23/19 0653    Narrative:       Test Reason : afib  Blood Pressure : **/** mmHG  Vent. Rate : 097 BPM     Atrial Rate : 241 BPM     P-R Int : 000 ms          QRS Dur : 090 ms      QT Int : 372 ms       P-R-T Axes : 000 145 -33 degrees     QTc Int : 472 ms    Atrial flutter with variable AV block with premature ventricular or  aberrantly conducted complexes  Left posterior fascicular block  Possible Inferior infarct (cited on or before 22-OCT-2019)  Abnormal ECG  When compared with ECG of 22-OCT-2019 22:01, (Unconfirmed)  No significant change was found    Referred By:  MICHELLE           Confirmed By:             Atrial flutter with rapid ventricular response (CMS/HCC)    Essential hypertension    CHF (congestive heart failure) (CMS/HCC)    Type 2 diabetes mellitus (CMS/HCC)    Dementia (CMS/HCC)    PRETTY (acute kidney injury) (CMS/HCC)    Elevated troponin    Hyponatremia    Leukocytosis    Hypotension    Abrasions of multiple sites      Assessment/Plan:    1.  Atrial flutter, rate controlled  -CHADS VASC 5  -Patient has not previously been on anticoagulation, discussed risk and benefits.  She does live independently, denies any prior bleeding complications or recent falls/injuries  -Has been on Plavix for her CAD  -Will hold Plavix and start Eliquis 2.5 mg twice daily  -TSH is reduced at 0.142, a free T4 is pending  -Given patient's asymptomatic status, age we will continue with a rate control strategy and anticoagulation at this time.  No plans for cardioversion currently patient may eat    2.  Coronary artery disease with history of CABG x2  -Echo pending  -Currently with no complaints of chest pain or dyspnea  -BMP within normal limits, chest x-ray does not suggest volume overload  -EKG does  show atrial flutter but no acute ischemic changes  -Initial troponins 0.035 but it down trended to within normal limits, no evidence of ACS  -Hold aspirin and Plavix and starting Eliquis  -LDL at goal, 68 continue low-dose statin    3.  PRETTY/CKD  -Unknown baseline  -Creatinine elevated to 3.42 this morning with GFR 13  -Reported hypotension at home  -Hold ACE inhibitor, spironolactone, HCTZ, amlodipine  -Possible nephrology consult  -Appears fairly euvolemic    4.  DM  -per hospital medicine team    5.  Hyponatremia  -receving gentle IVF    6.  Dementia  -on donepezil    7.  Leukocytosis  -resolved  -culutres negative to date    Will follow    Discussed with Dr. Fernando Cartwright MD

## 2019-10-23 NOTE — PROGRESS NOTES
Discharge Planning Assessment  [facility]     Patient Name: Allyn Dodge  MRN: [id number]  Today's Date: 10/23/2019    Admit Date: 10/22/2019    Discharge Needs Assessment     Row Name 10/23/19 1149       Living Environment    Lives With  spouse    Name(s) of Who Lives With Patient  spouse is Raza. Tells me only her  spouse is in the home with her    Current Living Arrangements  home/apartment/condo    Primary Care Provided by  self    Provides Primary Care For  no one    Family Caregiver if Needed  child(rufina), adult    Family Caregiver Names  Tells me children more helpful then spouse (daughter Delfina Kennedy)    Quality of Family Relationships  supportive    Able to Return to Prior Arrangements  yes       Resource/Environmental Concerns    Resource/Environmental Concerns  none    Transportation Concerns  car, none       Transition Planning    Patient/Family Anticipates Transition to  home with family    Patient/Family Anticipated Services at Transition  home health care    Transportation Anticipated  family or friend will provide       Discharge Needs Assessment    Readmission Within the Last 30 Days  no previous admission in last 30 days    Concerns to be Addressed  discharge planning    Equipment Currently Used at Home  glucometer;walker, rolling;wheelchair;shower chair;grab bar    Anticipated Changes Related to Illness  none    Equipment Needed After Discharge  none    Discharge Facility/Level of Care Needs  home with home health    Offered/Gave Vendor List  yes home health list for [facility]        Discharge Plan     Row Name 10/23/19 5349       Plan    Plan  Home with spouse    Patient/Family in Agreement with Plan  yes    Plan Comments  I spoke with patient this morning to discuss discharge planning. She is a pleasant 89 year old  lady from [place] who resides with her spouse. She tells me most of her assistance comes from her daughter, Delfina. Delfina helps with household cleaning.    We  discussed rehab needs and prefers home with home health and not going to rehab. List given for [facility] and she tells me she wants to talk with her daughter Delfina about this.    Case management will continue to follow and assist with discharge planning needs.    Donna GUTIERREZ, RN, [facility] [phone]          Final Discharge Disposition Code  06 - home with home health care        Destination      No service coordination in this encounter.      Durable Medical Equipment      No service coordination in this encounter.      Dialysis/Infusion      No service coordination in this encounter.      Home Medical Care      No service coordination in this encounter.      Therapy      No service coordination in this encounter.      Community Resources      No service coordination in this encounter.        Expected Discharge Date and Time     Expected Discharge Date Expected Discharge Time    Oct 25, 2019         Demographic Summary     Row Name 10/23/19 1147       General Information    Referral Source  admission list    Preferred Language  English    General Information Comments  PCP is Miguel Angel Carrillo       Contact Information    Permission Granted to Share Info With      Contact Information Comments  [phone]        Functional Status     Row Name 10/23/19 1148       Functional Status    Usual Activity Tolerance  good    Current Activity Tolerance  moderate       Functional Status, IADL    Medications  independent    Meal Preparation  independent    Housekeeping  independent;assistive person    Laundry  independent    Shopping  independent    IADL Comments  daughter helps with house cleaning       Employment/    Employment/ Comments  :Patient has [employer] Medicare replacement  insurance and denies concerns regarding coverage or disruption in coverage issues. Patient has drug coverage and denies issues obtaining or affording current medications.         Psychosocial    No  documentation.       Abuse/Neglect    No documentation.       Legal    No documentation.       Substance Abuse    No documentation.       Patient Forms    No documentation.           Donna Burk RN

## 2019-10-23 NOTE — PLAN OF CARE
Problem: Patient Care Overview  Goal: Plan of Care Review  Outcome: Ongoing (interventions implemented as appropriate)   10/23/19 1112   Coping/Psychosocial   Plan of Care Reviewed With patient   OTHER   Outcome Summary PT consult performed. Pt transferred with min assist and ambul 20 ft to bathroom with r wx and min assist. Further gait deferred due to onset of dizziness. Anticipate return home with family assist and HHPT

## 2019-10-23 NOTE — PLAN OF CARE
Problem: Patient Care Overview  Goal: Plan of Care Review  Outcome: Ongoing (interventions implemented as appropriate)   10/23/19 3139   Coping/Psychosocial   Plan of Care Reviewed With patient   Plan of Care Review   Progress no change   OTHER   Outcome Summary VSS. RA. A-Flutter on the monitor. No c/o pain or discomfort. Cards to see pt today. Will continue to monitor.

## 2019-10-23 NOTE — PLAN OF CARE
Problem: Patient Care Overview  Goal: Plan of Care Review  Outcome: Ongoing (interventions implemented as appropriate)   10/23/19 6782   Coping/Psychosocial   Plan of Care Reviewed With patient   Plan of Care Review   Progress no change   OTHER   Outcome Summary Consulted to assess patient BLE for possible wounds POA. Assessment performed. At this time patient presents with several small abrasion and scabbed areas to he bilateral shins. Patient stated that she bumps into things and she has 90 year old skin. Nothing is needed from WOC nurse or skilled wound care. Just leave open to air and perform good general skin care. Will sign off. Thanks

## 2019-10-24 VITALS
BODY MASS INDEX: 31.55 KG/M2 | SYSTOLIC BLOOD PRESSURE: 111 MMHG | HEART RATE: 76 BPM | WEIGHT: 178.1 LBS | TEMPERATURE: 98 F | RESPIRATION RATE: 17 BRPM | OXYGEN SATURATION: 93 % | DIASTOLIC BLOOD PRESSURE: 69 MMHG | HEIGHT: 63 IN

## 2019-10-24 LAB
ALBUMIN SERPL-MCNC: 3.4 G/DL (ref 3.5–5.2)
ANION GAP SERPL CALCULATED.3IONS-SCNC: 12 MMOL/L (ref 5–15)
BASOPHILS # BLD AUTO: 0.01 10*3/MM3 (ref 0–0.2)
BASOPHILS NFR BLD AUTO: 0.1 % (ref 0–1.5)
BUN BLD-MCNC: 84 MG/DL (ref 8–23)
BUN/CREAT SERPL: 31.9 (ref 7–25)
CALCIUM SPEC-SCNC: 9 MG/DL (ref 8.2–9.6)
CHLORIDE SERPL-SCNC: 100 MMOL/L (ref 98–107)
CO2 SERPL-SCNC: 20 MMOL/L (ref 22–29)
CREAT BLD-MCNC: 2.63 MG/DL (ref 0.57–1)
DEPRECATED RDW RBC AUTO: 44 FL (ref 37–54)
EOSINOPHIL # BLD AUTO: 0.18 10*3/MM3 (ref 0–0.4)
EOSINOPHIL NFR BLD AUTO: 2 % (ref 0.3–6.2)
ERYTHROCYTE [DISTWIDTH] IN BLOOD BY AUTOMATED COUNT: 14.3 % (ref 12.3–15.4)
GFR SERPL CREATININE-BSD FRML MDRD: 17 ML/MIN/1.73
GLUCOSE BLD-MCNC: 80 MG/DL (ref 65–99)
GLUCOSE BLDC GLUCOMTR-MCNC: 109 MG/DL (ref 70–130)
GLUCOSE BLDC GLUCOMTR-MCNC: 84 MG/DL (ref 70–130)
GLUCOSE BLDC GLUCOMTR-MCNC: 89 MG/DL (ref 70–130)
HCT VFR BLD AUTO: 33.8 % (ref 34–46.6)
HGB BLD-MCNC: 10.6 G/DL (ref 12–15.9)
IMM GRANULOCYTES # BLD AUTO: 0.05 10*3/MM3 (ref 0–0.05)
IMM GRANULOCYTES NFR BLD AUTO: 0.6 % (ref 0–0.5)
LYMPHOCYTES # BLD AUTO: 2.63 10*3/MM3 (ref 0.7–3.1)
LYMPHOCYTES NFR BLD AUTO: 29 % (ref 19.6–45.3)
MCH RBC QN AUTO: 26.6 PG (ref 26.6–33)
MCHC RBC AUTO-ENTMCNC: 31.4 G/DL (ref 31.5–35.7)
MCV RBC AUTO: 84.9 FL (ref 79–97)
MONOCYTES # BLD AUTO: 0.75 10*3/MM3 (ref 0.1–0.9)
MONOCYTES NFR BLD AUTO: 8.3 % (ref 5–12)
NEUTROPHILS # BLD AUTO: 5.45 10*3/MM3 (ref 1.7–7)
NEUTROPHILS NFR BLD AUTO: 60 % (ref 42.7–76)
NRBC BLD AUTO-RTO: 0 /100 WBC (ref 0–0.2)
PHOSPHATE SERPL-MCNC: 3.3 MG/DL (ref 2.5–4.5)
PLATELET # BLD AUTO: 294 10*3/MM3 (ref 140–450)
PMV BLD AUTO: 9.9 FL (ref 6–12)
POTASSIUM BLD-SCNC: 4 MMOL/L (ref 3.5–5.2)
RBC # BLD AUTO: 3.98 10*6/MM3 (ref 3.77–5.28)
SODIUM BLD-SCNC: 132 MMOL/L (ref 136–145)
WBC NRBC COR # BLD: 9.07 10*3/MM3 (ref 3.4–10.8)

## 2019-10-24 PROCEDURE — 99214 OFFICE O/P EST MOD 30 MIN: CPT | Performed by: INTERNAL MEDICINE

## 2019-10-24 PROCEDURE — 85025 COMPLETE CBC W/AUTO DIFF WBC: CPT | Performed by: FAMILY MEDICINE

## 2019-10-24 PROCEDURE — 80069 RENAL FUNCTION PANEL: CPT | Performed by: FAMILY MEDICINE

## 2019-10-24 PROCEDURE — 99239 HOSP IP/OBS DSCHRG MGMT >30: CPT | Performed by: INTERNAL MEDICINE

## 2019-10-24 PROCEDURE — G0378 HOSPITAL OBSERVATION PER HR: HCPCS

## 2019-10-24 PROCEDURE — 82962 GLUCOSE BLOOD TEST: CPT

## 2019-10-24 RX ADMIN — MUPIROCIN: 20 OINTMENT TOPICAL at 09:29

## 2019-10-24 RX ADMIN — APIXABAN 2.5 MG: 2.5 TABLET, FILM COATED ORAL at 09:34

## 2019-10-24 RX ADMIN — CITALOPRAM HYDROBROMIDE 20 MG: 20 TABLET ORAL at 09:34

## 2019-10-24 RX ADMIN — OXYBUTYNIN CHLORIDE 5 MG: 5 TABLET ORAL at 09:34

## 2019-10-24 RX ADMIN — METOPROLOL TARTRATE 25 MG: 25 TABLET ORAL at 09:34

## 2019-10-24 RX ADMIN — BUSPIRONE HYDROCHLORIDE 15 MG: 5 TABLET ORAL at 09:34

## 2019-10-24 RX ADMIN — NYSTATIN: 100000 POWDER TOPICAL at 09:29

## 2019-10-24 RX ADMIN — SODIUM CHLORIDE, PRESERVATIVE FREE 10 ML: 5 INJECTION INTRAVENOUS at 09:35

## 2019-10-24 RX ADMIN — METHIMAZOLE 5 MG: 5 TABLET ORAL at 09:34

## 2019-10-24 NOTE — DISCHARGE SUMMARY
[facility] Medicine Services  DISCHARGE SUMMARY    Patient Name: Allyn Dodge  : 1929  MRN: [id number]    Date of Admission: 10/22/2019  Date of Discharge:  10/24/19  Primary Care Physician: Miguel Angel Carrillo MD    Consults     Date and Time Order Name Status Description    10/23/2019 0030 Inpatient Cardiology Consult Completed           Hospital Course     Presenting Problem:   Atrial flutter with rapid ventricular response (CMS/HCC) [I48.92]    Active Hospital Problems    Diagnosis  POA   • **PRETTY (acute kidney injury) (CMS/HCC) [N17.9]  Yes     Priority: Medium   • Abrasions of multiple sites [T07.XXXA]  Yes   • Atrial flutter with rapid ventricular response (CMS/HCC) [I48.92]  Yes   • Essential hypertension [I10]  Yes   • Type 2 diabetes mellitus (CMS/HCC) [E11.9]  Yes   • Dementia (CMS/HCC) [F03.90]  Yes   • Elevated troponin [R79.89]  Yes   • Hyponatremia [E87.1]  Yes   • Hypotension [I95.9]  Yes      Resolved Hospital Problems   No resolved problems to display.          Hospital Course:  Allyn Dodge is a 89 y.o. female with a history of recently diagnosed A. fib, coronary artery disease status post CABG, type 2 diabetes, hypertension, dementia who presents to the emergency room with complaints of generalized weakness.  She was just diagnosed with A. fib recently was placed on metoprolol and since that time has been getting increasingly weak.  She also had been taking multiple antihypertensives at home.  Upon presentation blood pressure was noted to be 94/34.  She was in a flutter which was rate controlled.  Creatinine noted to be 3.6 with nothing here for comparison.  She was admitted for further evaluation.  Blood pressure medications were held including Norvasc, lisinopril, Aldactone, hydrochlorothiazide.  She did receive some IV fluids.  Renal ultrasound was unremarkable.  Creatinine has improved and at the day of discharge is 2.6.  Baseline is still unclear and I  suspect she does have some chronic dysfunction but definitely did have an acute component likely related to dehydration and hypotension.  Blood pressure is improved and will continue to hold those medications.  She was seen by cardiology in regards to her A. fib.  Recommend continuing of beta-blocker.  Also recommend initiating renally dosed Eliquis at 2.5 mg twice daily and stopping her Plavix while continuing her aspirin.  Day of discharge she is very close to her baseline and anxious to get home.  She has been to go home with care of her family.  I would like a primary care physician to recheck a BMP within 1week to ensure stability.  Also of note that TSH was somewhat low at 0.14 and free T4 was elevated at 2.10.  No overt signs of hyperthyroidism and I think they should be rechecked prior to initiating therapy, therefore would recommend they get rechecked in the next 4 to 6 weeks.      Discharge Follow Up Recommendations for labs/diagnostics:  Recheck BMP within 1 week  Recheck TSH in 4-6 weeks    Day of Discharge     HPI:   Continue to feel improved.  Wants to go home.  Grand-dtr present and feels comfortable with that plan.      Review of Systems  Gen- No fevers, chills  CV- No chest pain, palpitations  Resp- No cough, dyspnea  GI- No N/V/D, abd pain      Otherwise ROS is negative except as mentioned in the HPI.    Vital Signs:   Temp:  [97.7 °F (36.5 °C)-98.4 °F (36.9 °C)] 97.9 °F (36.6 °C)  Heart Rate:  [76-84] 76  Resp:  [17-20] 17  BP: ()/(51-87) 96/51     Physical Exam:  Constitutional: No acute distress, awake, alert, looks younger than age  HENT: NCAT, mucous membranes moist  Respiratory: Clear to auscultation bilaterally, respiratory effort normal   Cardiovascular: irregular, no murmurs, rubs, or gallops,  Gastrointestinal: obese, Positive bowel sounds, soft, nontender, nondistended  Musculoskeletal: No bilateral ankle edema  Psychiatric: Appropriate affect, cooperative  Neurologic: Oriented x  3, no focal deficits  Skin: No rashes      Pertinent  and/or Most Recent Results     Results from last 7 days   Lab Units 10/24/19  0445 10/23/19  0202 10/22/19  1702   WBC 10*3/mm3 9.07 8.74 14.92*   HEMOGLOBIN g/dL 10.6* 11.0* 12.0   HEMATOCRIT % 33.8* 35.0 38.4   PLATELETS 10*3/mm3 294 302 328   SODIUM mmol/L 132* 133* 130*   POTASSIUM mmol/L 4.0 4.1 4.1   CHLORIDE mmol/L 100 94* 93*   CO2 mmol/L 20.0* 24.0 22.0   BUN mg/dL 84* 87* 90*   CREATININE mg/dL 2.63* 3.42* 3.66*   GLUCOSE mg/dL 80 174* 231*   CALCIUM mg/dL 9.0 9.2 9.3     Results from last 7 days   Lab Units 10/22/19  1702   BILIRUBIN mg/dL 0.5   ALK PHOS U/L 48   ALT (SGPT) U/L 10   AST (SGOT) U/L 11     Results from last 7 days   Lab Units 10/23/19  0202   CHOLESTEROL mg/dL 132   TRIGLYCERIDES mg/dL 94   HDL CHOL mg/dL 45     Results from last 7 days   Lab Units 10/23/19  0202 10/23/19  0018 10/22/19  1951 10/22/19  1702   TSH uIU/mL  --  0.142*  --   --    HEMOGLOBIN A1C % 10.40*  --   --   --    PROBNP pg/mL  --   --   --  945.1   TROPONIN T ng/mL  --  0.025 0.024 0.035*   PROCALCITONIN ng/mL  --  0.09*  --   --    LACTATE mmol/L 1.4  --   --   --        Brief Urine Lab Results  (Last result in the past 365 days)      Color   Clarity   Blood   Leuk Est   Nitrite   Protein   CREAT   Urine HCG        10/23/19 0030 Yellow Clear Negative Moderate (2+) Negative Negative               Microbiology Results Abnormal     Procedure Component Value - Date/Time    Blood Culture - Blood, Arm, Right [[id number]] Collected:  10/23/19 0202    Lab Status:  Preliminary result Specimen:  Blood from Arm, Right Updated:  10/24/19 0309     Blood Culture No growth at 24 hours    Blood Culture - Blood, Arm, Left [[id number]] Collected:  10/23/19 0202    Lab Status:  Preliminary result Specimen:  Blood from Arm, Left Updated:  10/24/19 0309     Blood Culture No growth at 24 hours          Imaging Results (all)     Procedure Component Value Units Date/Time    US Renal  Bilateral [[id number]] Collected:  10/24/19 0816     Updated:  10/24/19 0955    Narrative:       EXAMINATION: US RENAL BILATERAL- 10/23/2019     INDICATION: PRETTY on CKD; I48.92-Unspecified atrial flutter;  N28.9-Disorder of kidney and ureter, unspecified; N18.9-Chronic kidney  disease, unspecified; Z86.79-Personal history of other diseases of the  circulatory system; Z86.59-Personal history of other mental and  behavioral disorders; Z86.79-Personal history of other diseases of the  circulatory system      TECHNIQUE: Ultrasound kidneys and urinary bladder     COMPARISON: NONE     FINDINGS:      Right kidney measures 8.5 cm in length with a 1.6 cm area of  hypoechogenicity at the periphery of simple renal cortical cyst variant  without complex features node internally or internal flow. No  hydronephrosis or calculus.     Left kidney measures 7.7 cm in length containing 1.8 cm area of  hypoechogenicity at the periphery of simple renal cortical cyst variant  without complex features noted internally or internal flow identified.   No hydronephrosis or calculus.     Urinary bladder is minimally distended and grossly unremarkable.       Impression:       No acute sonographic findings within the visualized kidneys  or urinary bladder, specifically no hydronephrosis.      D:  10/24/2019  E:  10/24/2019       XR Chest 1 View [[id number]] Collected:  10/22/19 1646     Updated:  10/22/19 1744    Narrative:       EXAMINATION: XR CHEST 1 VW-10/22/2019:      INDICATION: Weak/Dizzy/AMS triage protocol.      COMPARISON: NONE.     FINDINGS: The heart is at the upper limits of normal. There are  postoperative cardiac changes. The heart is compensated. There is no  acute pulmonary process. There is no mass or effusion.           Impression:       Chronic pulmonary findings. There are no acute  abnormalities.     D:  10/22/2019  E:  10/22/2019     This report was finalized on 10/22/2019 5:41 PM by Dr. Honorio Rios MD.                        Results for orders placed during the hospital encounter of 10/22/19   Adult Transthoracic Echo Complete With Contrast if Necessary Per Protocol    Narrative · Left ventricular systolic function is normal  · Ejection fraction variable due to atrial flutter. Estimated EF appears   to be in the range of 56 - 60%.  · Right ventricular cavity is mild-to-moderately dilated. Mildly reduced   right ventricular systolic function noted.  · Mild biatrial enlargement  · Mild MAC is present. Mild mitral valve regurgitation is present.  · Mild tricuspid valve regurgitation is present.Estimated right   ventricular systolic pressure from tricuspid regurgitation is normal (<35   mmHg).           Order Current Status    Blood Culture - Blood, Arm, Left Preliminary result    Blood Culture - Blood, Arm, Right Preliminary result        Discharge Details        Discharge Medications      New Medications      Instructions Start Date   apixaban 2.5 MG tablet tablet  Commonly known as:  ELIQUIS   2.5 mg, Oral, Every 12 Hours Scheduled         Continue These Medications      Instructions Start Date   atorvastatin 10 MG tablet  Commonly known as:  LIPITOR   10 mg, Oral, Nightly      busPIRone 15 MG tablet  Commonly known as:  BUSPAR   15 mg, Oral, 2 Times Daily      citalopram 20 MG tablet  Commonly known as:  CeleXA   20 mg, Oral, Daily      donepezil 10 MG tablet  Commonly known as:  ARICEPT   10 mg, Oral, Nightly      gabapentin 300 MG capsule  Commonly known as:  NEURONTIN   300 mg, Oral, 3 Times Daily      glimepiride 2 MG tablet  Commonly known as:  AMARYL   2 mg, Oral, Every Morning Before Breakfast      HYDROXYZINE HCL PO   25 mg, Oral, Every 8 Hours PRN      levocetirizine 2.5 MG/5ML solution  Commonly known as:  XYZAL   5 mg, Oral, Every Evening      metoprolol tartrate 25 MG tablet  Commonly known as:  LOPRESSOR   25 mg, Oral, Daily      oxybutynin 5 MG tablet  Commonly known as:  DITROPAN   5 mg, Oral, Daily      QUEtiapine 25  MG tablet  Commonly known as:  SEROquel   12.5 mg, Oral, Daily PRN         Stop These Medications    amLODIPine 5 MG tablet  Commonly known as:  NORVASC     clopidogrel 75 MG tablet  Commonly known as:  PLAVIX     furosemide 20 MG tablet  Commonly known as:  LASIX     hydroCHLOROthiazide 25 MG tablet  Commonly known as:  HYDRODIURIL     lisinopril 40 MG tablet  Commonly known as:  PRINIVIL,ZESTRIL     predniSONE 20 MG tablet  Commonly known as:  DELTASONE     spironolactone 25 MG tablet  Commonly known as:  ALDACTONE            Allergies   Allergen Reactions   • Codeine Other (See Comments)     UNKNOWN   • Penicillins Other (See Comments)     UNKNOWN         Discharge Disposition:  Home or Self Care    Diet:  Hospital:  Diet Order   Procedures   • Diet Regular; Cardiac, Consistent Carbohydrate     Discharge:   Diet Instructions     Diet: Regular, Consistent Carbohydrate, Cardiac      Discharge Diet:   Regular  Consistent Carbohydrate  Cardiac             Discharge Activity:   Activity Instructions     Activity as Tolerated              CODE STATUS:    Code Status and Medical Interventions:   Ordered at: 10/22/19 2524     Code Status:    CPR     Medical Interventions (Level of Support Prior to Arrest):    Full         No future appointments.    Additional Instructions for the Follow-ups that You Need to Schedule     Discharge Follow-up with PCP   As directed       Currently Documented PCP:    Miguel Angel Carrillo MD    PCP Phone Number:    [phone]     Follow Up Details:  PCP 1 week               Time Spent on Discharge:  38 minutes    Electronically signed by Patric Hannah MD, 10/24/19, 12:54 PM.

## 2019-10-24 NOTE — PLAN OF CARE
Problem: Patient Care Overview  Goal: Plan of Care Review  Outcome: Ongoing (interventions implemented as appropriate)   10/24/19 9390   Coping/Psychosocial   Plan of Care Reviewed With patient   Plan of Care Review   Progress no change   OTHER   Outcome Summary vss. ra. aflutter on monitor - controlled. Pt confused at times - pulled out IV and telemetry stickers off while sleeping. Pt up with x1 assit to bedside commode, pt only voiding 50-100ml at a time. Renal U/S completed this shift. Will continue to monitor.   Coping/Psychosocial   Patient Agreement with Plan of Care agrees

## 2019-10-24 NOTE — PROGRESS NOTES
"[facility] Progress Note     LOS: 2 days   Patient Care Team:  Miguel Angel Carrillo MD as PCP - General (Family Medicine)  PCP:  Miguel Angel Carrillo MD    Chief Complaint: Follow-up atrial flutter    Subjective: Patient remains in rate controlled atrial flutter.  No complaints this morning.  Nursing notes reviewed had some issues with confusion overnight.  None currently      Review of Systems:   All systems have been reviewed and are negative with the exception of those mentioned above.      Objective:    Vital Sign Min/Max for last 24 hours  Temp  Min: 97.5 °F (36.4 °C)  Max: 98.4 °F (36.9 °C)   BP  Min: 103/84  Max: 133/74   Pulse  Min: 76  Max: 100   Resp  Min: 16  Max: 20   SpO2  Min: 96 %  Max: 96 %   No Data Recorded   Weight  Min: 80.8 kg (178 lb 1.6 oz)  Max: 82.6 kg (182 lb)     Flowsheet Rows      First Filed Value   Admission Height  160 cm (63\") Documented at 10/22/2019 1756   Admission Weight  90.7 kg (200 lb) Documented at 10/22/2019 1756          Telemetry: Rate controlled atrial flutter      Intake/Output Summary (Last 24 hours) at 10/24/2019 0947  Last data filed at 10/24/2019 0929  Gross per 24 hour   Intake --   Output 1020 ml   Net -1020 ml     Intake & Output (last 3 days)       10/21 0701 - 10/22 0700 10/22 0701 - 10/23 0700 10/23 0701 - 10/24 0700 10/24 0701 - 10/25 0700    I.V. (mL/kg)  1272 (15.4)      IV Piggyback  100      Total Intake(mL/kg)  1372 (16.6)      Urine (mL/kg/hr)   695 (0.4) 500 (2.2)    Total Output   695 500    Net  +1372 -695 -500            Urine Unmeasured Occurrence   4 x            Physical Exam:  Constitutional: No acute distres  Cardiovascular:  Normal rate with irregular rhythm  No murmur heard.  Pulmonary/Chest: Effort normal. She has no wheezes. She has no rales.   Abdominal: Soft. There is no tenderness.   Musculoskeletal: Very trace edema  Skin:   Some scattered scrapes/bruises      LABS/DIAGNOSTIC " DATA:  Results from last 7 days   Lab Units 10/24/19  0445 10/23/19  0202 10/22/19  1702   WBC 10*3/mm3 9.07 8.74 14.92*   HEMOGLOBIN g/dL 10.6* 11.0* 12.0   HEMATOCRIT % 33.8* 35.0 38.4   PLATELETS 10*3/mm3 294 302 328     Lab Results   Lab Value Date/Time    TROPONINT 0.025 10/23/2019 0018    TROPONINT 0.024 10/22/2019 1951    TROPONINT 0.035 (C) 10/22/2019 1702         Results from last 7 days   Lab Units 10/24/19  0445 10/23/19  0202 10/22/19  1702   SODIUM mmol/L 132* 133* 130*   POTASSIUM mmol/L 4.0 4.1 4.1   CHLORIDE mmol/L 100 94* 93*   CO2 mmol/L 20.0* 24.0 22.0   BUN mg/dL 84* 87* 90*   CREATININE mg/dL 2.63* 3.42* 3.66*   CALCIUM mg/dL 9.0 9.2 9.3   BILIRUBIN mg/dL  --   --  0.5   ALK PHOS U/L  --   --  48   ALT (SGPT) U/L  --   --  10   AST (SGOT) U/L  --   --  11   GLUCOSE mg/dL 80 174* 231*     Results from last 7 days   Lab Units 10/23/19  0202   HEMOGLOBIN A1C % 10.40*     Results from last 7 days   Lab Units 10/23/19  0202   CHOLESTEROL mg/dL 132   TRIGLYCERIDES mg/dL 94   HDL CHOL mg/dL 45   LDL CHOL mg/dL 68     Results from last 7 days   Lab Units 10/23/19  1003 10/23/19  0018   TSH uIU/mL  --  0.142*   FREE T4 ng/dL 2.10*  --            Medication Review:     apixaban 2.5 mg Oral Q12H   atorvastatin 10 mg Oral Nightly   busPIRone 15 mg Oral BID   ceftriaxone 1 g Intravenous Nightly   citalopram 20 mg Oral Daily   donepezil 10 mg Oral Nightly   insulin lispro 0-7 Units Subcutaneous 4x Daily With Meals & Nightly   methIMAzole 5 mg Oral Daily   metoprolol tartrate 25 mg Oral Q12H   mupirocin  Topical Q12H   nystatin  Topical Q12H   oxybutynin 5 mg Oral Daily   sodium chloride 10 mL Intravenous Q12H        Echo:   · Left ventricular systolic function is normal  · Ejection fraction variable due to atrial flutter. Estimated EF appears to be in the range of 56 - 60%.  · Right ventricular cavity is mild-to-moderately dilated. Mildly reduced right ventricular systolic function noted.  · Mild biatrial  enlargement  · Mild MAC is present. Mild mitral valve regurgitation is present.  · Mild tricuspid valve regurgitation is present.Estimated right ventricular systolic pressure from tricuspid regurgitation is normal (<35 mmHg).             Atrial flutter with rapid ventricular response (CMS/HCC)    Essential hypertension    CHF (congestive heart failure) (CMS/HCC)    Type 2 diabetes mellitus (CMS/HCC)    Dementia (CMS/HCC)    PRETTY (acute kidney injury) (CMS/[facility])    Elevated troponin    Hyponatremia    Leukocytosis    Hypotension    Abrasions of multiple sites      Assessment/Plan:  1.  Atrial flutter, rate controlled  -CHADS VASC 5  -Patient has not previously been on anticoagulation, discussed risk and benefits.  She does live independently, denies any prior bleeding complications or recent falls/injuries  - started Eliquis 2.5 mg twice daily  -TSH is reduced at 0.142, a free T4 is is elevated  -Patient started on methimazole per primary team  -Given patient's asymptomatic status, age we will continue with a rate control strategy and anticoagulation at this time.  No plans for cardioversion currently     2.  Coronary artery disease with history of CABG x2  -Echo with preserved LV function and ejection fraction 56 to 60%  -Currently with no complaints of chest pain or dyspnea  -BMP within normal limits, chest x-ray does not suggest volume overload  -EKG does show atrial flutter but no acute ischemic changes  -Initial troponins 0.035 but it down trended to within normal limits, no evidence of ACS  -Aspirin and Plavix discontinued since started Eliquis  -LDL at goal, 68 continue low-dose statin     3.  PRETTY/CKD  -Unknown baseline  -Creatinine 3.6 on baseline improved to 2.63  -Reported hypotension at home  -Hold ACE inhibitor, spironolactone, HCTZ, amlodipine  -Appears fairly euvolemic     4.  DM  -per hospital medicine team     5.  Hyponatremia  -Currently 132,     6.  Dementia  -on donepezil     7.   Leukocytosis  -resolved  -culutres negative to date     At present patient is rate controlled and now established on anticoagulation for atrial fibrillation/flutter.  She is generally asymptomatic.  Can actually be okay for discharge from cardiac standpoint.  Main issue right now is her renal function.  We will follow peripherally and please call with questions.  At this time would hold other blood pressure medications aside from metoprolol               Jesus Cartwright MD   10/24/19  9:47 AM

## 2019-10-24 NOTE — PROGRESS NOTES
Case Management Discharge Note    Final Note: Spoke with patient and daughter at bedside - patient lives with her daughter in [facility]. She is active with [facility] home health - called to verify and resumption of care orders have been faxed along with discharge summary. Patient and daughter deny any DME needs and daughter plans to transport home when ready - bryant [phone]     Destination      No service has been selected for the patient.      Durable Medical Equipment      No service has been selected for the patient.      Dialysis/Infusion      No service has been selected for the patient.      Home Medical Care - Selection Complete      Service Provider Request Status Selected Services Address Phone Number Fax Number    [facility] Selected Home Health Services [address] [place] [id number] [phone]      Therapy      No service has been selected for the patient.      Community Resources      No service has been selected for the patient.             Final Discharge Disposition Code: 06 - home with home health care

## 2019-10-24 NOTE — DISCHARGE PLACEMENT REQUEST
"Please see orders to resume home health   Discharging home today   Thank you   Leanne Doss [phone]       Allyn Dodge (89 y.o. Female)     Date of Birth Social Security Number Address Home Phone MRN    [id number]  [address] [id number] [phone] [id number]    [place] Marital Status          None        Admission Date Admission Type Admitting Provider Attending Provider Department, Room/Bed    10/22/19 Emergency Arelis Jernigan MD Dossett, Lee M, MD [facility] 6A, N618/1    Discharge Date Discharge Disposition Discharge Destination         Home or Self Care              Attending Provider:  Patric Hannah MD    Allergies:  Codeine, Penicillins    Isolation:  None   Infection:  None   Code Status:  CPR    Ht:  160 cm (63\")   Wt:  80.8 kg (178 lb 1.6 oz)    Admission Cmt:  None   Principal Problem:  PRETTY (acute kidney injury) (CMS/[facility]) [N17.9]                 Active Insurance as of 10/22/2019     Primary Coverage     Payor Plan Insurance Group Employer/Plan Group    HUMANA MEDICARE REPLACEMENT HUMANA MEDICARE REPL [id number]     Payor Plan Address Payor Plan Phone Number Payor Plan Fax Number Effective Dates    [address] [address] [phone]  2018 - None Entered    [facility] [id number]       Subscriber Name Subscriber Birth Date Member ID       ALLYN DODGE 1929 [id number]                 Emergency Contacts      (Rel.) Home Phone Work Phone Mobile Phone    DANITZA SOFIA (Daughter) -- [phone]    MISTY DODGE (Son) -- -- [phone]    IMMANUEL DODGE (Son) -- -- [phone]    GILLIAN MALCOLM (Relative) [phone] -- [phone]        [address] [facility] [address]  Phone:  [phone]  Fax:          Patient:     Allyn Dodge MRN:  [id number]   [address] [facility] [address] :  1929  SSN:    Phone: [phone] Sex:  F      INSURANCE PAYOR PLAN GROUP # " SUBSCRIBER ID   Primary:    HUMANA MEDICARE REPLACEMENT [id number]   Admitting Diagnosis: Atrial flutter with rapid ventricular response (CMS/HCC) [I48.92]  Order Date:  Oct 24, 2019         Notify Home Health       (Order ID: [id number])     Diagnosis:         Priority:  Routine Expected Date:   Expiration Date:        Interval:  Until Discontinued Count:    Comments: Please resume home health as prior to admission        Specimen Type:   Specimen Source:   Specimen Taken Date:   Specimen Taken Time:                   Verbal Order Mode: Per protocol: cosign required  Authorizing Provider: Patric Hannah MD  Authorizing Provider's NPI: [id number]     Order Entered By: Leanne Doss RN 10/24/2019  2:33 PM     Electronically signed by:  Patric Hannah MD  10/24/2019  2:35 PM                History & Physical      Alona Méndez DO at 10/22/19 [facility]              [facility] Medicine Services  HISTORY AND PHYSICAL    Patient Name: Allyn Dodge  : 1929  MRN: [id number]  Primary Care Physician: Miguel Angel Crarillo MD  Date of admission: 10/22/2019      Subjective   Subjective     Chief Complaint:  Weakness, irregular heartbeat, low blood pressure    HPI:  Allyn Dodge is a 89 y.o. female with PMH significant for atrial flutter/fib, CAD s/p CABG x2, DM2, HTN, MI, mild dementia, and CHF who presents to the ED with complaint of weakness, irregular heartbeat, and low blood pressure.  Per her family at bedside, approximately 2 months ago she was found to have concern for weakness and falls with concern of irregular heartbeat.  She follows with [facility], and it did take a couple of weeks for evaluation and studies to occur.  It was determined that she had atrial fibrillation.  Approximately 2 weeks ago she was placed on metoprolol.  Since that time she has been experiencing increased weakness.  Per family at bedside, her granddaughter stays with her to help care for her.  Her  granddaughter reported to them that over the last 2 days she is not wanted to get out of bed and has had decreased appetite.  Today, while she was working with physical therapy, she became increasingly lethargic and was found to be hypotensive.  Upon arrival of EMS her blood pressure was 94/34 vomiting presentation to the ED.  She denies any chest pain, shortness of breath or recent illness.  Of note she does have mild dementia with short-term memory loss and is a poor historian for recent medical events.  Family notes that she normally is evaluated at [facility].  Her daughter who is at bedside notes that she mistakenly asked EMS to bring her here thinking that her previous care was here, but would like to establish care with cardiology.  Upon arrival to the ED, she is found to have elevated troponin, as well as concern for PRETTY with mild hyponatremia and continued hypotension.  Mild leukocytosis is also noted.  She continues to deny chest pain, shortness of breath or urinary symptoms.  CXR is negative for acute findings.  EKG reveals no acute ischemia.  She will be admitted to hospital medicine for further evaluation    Review of Systems   Constitutional: Positive for activity change, appetite change and fatigue. Negative for chills and diaphoresis.   HENT: Negative.    Eyes: Negative for visual disturbance.   Respiratory: Negative for cough, chest tightness, shortness of breath and wheezing.    Cardiovascular: Positive for leg swelling. Negative for chest pain and palpitations.   Gastrointestinal: Negative for abdominal distention, abdominal pain, constipation, diarrhea and nausea.   Genitourinary: Negative for difficulty urinating, dysuria, frequency and urgency.   Musculoskeletal: Positive for gait problem. Negative for arthralgias and myalgias.   Skin: Positive for wound (c/o itching of lower ext ). Negative for color change and pallor.   Neurological: Positive for weakness. Negative for dizziness, speech  difficulty, numbness and headaches.   Psychiatric/Behavioral: Positive for confusion. The patient is not nervous/anxious.         All other systems reviewed and are negative.     Personal History     Past Medical History:   Diagnosis Date   • Atrial flutter (CMS/HCC)    • CHF (congestive heart failure) (CMS/HCC)    • Dementia (CMS/HCC)    • Diabetes mellitus (CMS/HCC)    • Hypertension    • MI (myocardial infarction) (CMS/HCC)        Past Surgical History:   Procedure Laterality Date   • APPENDECTOMY     • BACK SURGERY     • CARDIAC SURGERY     • CHOLECYSTECTOMY     • CORONARY ARTERY BYPASS GRAFT      X2   • HYSTERECTOMY     • KNEE SURGERY         Family History: family history includes Diabetes in her father; Hypertension in her mother; Peripheral vascular disease in her father. Otherwise pertinent FHx was reviewed and unremarkable.     Social History:  reports that she quit smoking about 31 years ago. Her smoking use included cigarettes. She smoked 1.50 packs per day. She has never used smokeless tobacco. She reports that she does not drink alcohol or use drugs.  Social History     Social History Narrative   • Not on file       Medications:    Available home medication information reviewed.  Medications Prior to Admission   Medication Sig Dispense Refill Last Dose   • amLODIPine (NORVASC) 5 MG tablet Take 5 mg by mouth Daily.   10/22/2019 at Unknown time   • atorvastatin (LIPITOR) 10 MG tablet Take 10 mg by mouth Every Night.   10/21/2019 at Unknown time   • busPIRone (BUSPAR) 15 MG tablet Take 15 mg by mouth 2 (Two) Times a Day.   10/22/2019 at Unknown time   • citalopram (CeleXA) 20 MG tablet Take 20 mg by mouth Daily.   10/22/2019 at Unknown time   • clopidogrel (PLAVIX) 75 MG tablet Take 75 mg by mouth Daily.   10/22/2019 at Unknown time   • donepezil (ARICEPT) 10 MG tablet Take 10 mg by mouth Every Night.   10/21/2019 at Unknown time   • furosemide (LASIX) 20 MG tablet Take 20 mg by mouth Daily.   10/22/2019  at Unknown time   • glimepiride (AMARYL) 2 MG tablet Take 2 mg by mouth Every Morning Before Breakfast.   10/22/2019 at Unknown time   • hydroCHLOROthiazide (HYDRODIURIL) 25 MG tablet Take 25 mg by mouth Daily.   10/22/2019 at Unknown time   • levocetirizine (XYZAL) 2.5 MG/5ML solution Take 5 mg by mouth Every Evening.   10/21/2019 at Unknown time   • lisinopril (PRINIVIL,ZESTRIL) 40 MG tablet Take 40 mg by mouth Daily.   10/22/2019 at Unknown time   • metoprolol tartrate (LOPRESSOR) 25 MG tablet Take 25 mg by mouth 2 (Two) Times a Day.   10/22/2019 at Unknown time   • oxybutynin (DITROPAN) 5 MG tablet Take 5 mg by mouth Daily.   10/22/2019 at Unknown time   • spironolactone (ALDACTONE) 25 MG tablet Take 25 mg by mouth Daily.   10/22/2019 at Unknown time   • GABAPENTIN PO Take  by mouth.   Unknown at Unknown time   • HYDROXYZINE HCL PO Take  by mouth.   Unknown at Unknown time   • predniSONE (DELTASONE) 20 MG tablet Take 20 mg by mouth Daily.   Unknown at Unknown time   • QUETIAPINE FUMARATE PO Take  by mouth.   Unknown at Unknown time       Allergies   Allergen Reactions   • Codeine Other (See Comments)     UNKNOWN   • Penicillins Other (See Comments)     UNKNOWN       Objective   Objective     Vital Signs:   Temp:  [99.2 °F (37.3 °C)] 99.2 °F (37.3 °C)  Heart Rate:  [102-106] 106  Resp:  [20] 20  BP: ()/(48-80) 107/80        Physical Exam   Constitutional: She appears well-developed and well-nourished. No distress.   HENT:   Head: Normocephalic and atraumatic.   Eyes: Pupils are equal, round, and reactive to light.   Neck: Normal range of motion. Neck supple. No JVD present.   Cardiovascular: Normal rate, normal heart sounds and intact distal pulses. An irregularly irregular rhythm present. Exam reveals no gallop and no friction rub.   No murmur heard.  Pulmonary/Chest: Effort normal and breath sounds normal. No respiratory distress. She has no wheezes. She has no rales.   Abdominal: Soft. Bowel sounds are  normal. She exhibits no distension and no mass. There is no tenderness. There is no guarding.   Musculoskeletal: Normal range of motion. She exhibits no edema or tenderness.   Neurological: She is alert.   Oriented to self and family at bedside.  Knows that she is at the hospital but thought she was at [facility].  Disoriented to time and situation   Skin: Skin is warm and dry. No erythema. No pallor.   excortication of anterior tibial region  Errythema under breasts   Psychiatric: She has a normal mood and affect. Her behavior is normal.   Vitals reviewed.       Results Reviewed:  I have personally reviewed current lab and radiology data.    Results from last 7 days   Lab Units 10/22/19  1702   WBC 10*3/mm3 14.92*   HEMOGLOBIN g/dL 12.0   HEMATOCRIT % 38.4   PLATELETS 10*3/mm3 328     Results from last 7 days   Lab Units 10/22/19  1951 10/22/19  1702   SODIUM mmol/L  --  130*   POTASSIUM mmol/L  --  4.1   CHLORIDE mmol/L  --  93*   CO2 mmol/L  --  22.0   BUN mg/dL  --  90*   CREATININE mg/dL  --  3.66*   GLUCOSE mg/dL  --  231*   CALCIUM mg/dL  --  9.3   ALT (SGPT) U/L  --  10   AST (SGOT) U/L  --  11   TROPONIN T ng/mL 0.024 0.035*   PROBNP pg/mL  --  945.1     Estimated Creatinine Clearance: 10.9 mL/min (A) (by C-G formula based on SCr of 3.66 mg/dL (H)).  Brief Urine Lab Results     None        Imaging Results (last 24 hours)     Procedure Component Value Units Date/Time    XR Chest 1 View [[id number]] Collected:  10/22/19 1646     Updated:  10/22/19 1744    Narrative:       EXAMINATION: XR CHEST 1 VW-10/22/2019:      INDICATION: Weak/Dizzy/AMS triage protocol.      COMPARISON: NONE.     FINDINGS: The heart is at the upper limits of normal. There are  postoperative cardiac changes. The heart is compensated. There is no  acute pulmonary process. There is no mass or effusion.           Impression:       Chronic pulmonary findings. There are no acute  abnormalities.     D:  10/22/2019  E:  10/22/2019     This report  was finalized on 10/22/2019 5:41 PM by Dr. Honorio Rios MD.                Assessment/Plan   Assessment / Plan     Active Hospital Problems    Diagnosis POA   • **Atrial flutter with rapid ventricular response (CMS/[facility]) [I48.92] Yes   • Essential hypertension [I10] Yes   • CHF (congestive heart failure) (CMS/[facility]) [I50.9] Yes   • Type 2 diabetes mellitus (CMS/[facility]) [E11.9] Yes   • Dementia (CMS/[facility]) [F03.90] Yes   • PRETTY (acute kidney injury) (CMS/[facility]) [N17.9] Yes   • Elevated troponin [R79.89] Yes   • Hyponatremia [E87.1] Yes   • Leukocytosis [D72.829] Yes   • Hypotension [I95.9] Yes     89-year-old female presenting to the ED with complaint of weakness, irregular heartbeat, and low blood pressure who was found to have concern for atrial flutter with RVR and PRETTY with hyponatremia and leukocytosis.    Atrial flutter with RVR  - Request records from [facility]  - Family notes that she has not followed along with cardiology since her CABG approximately 7 to 8 years ago  - Not anticoagulated, risk VS benefit given age and instability  - Currently rate is controlled, will hold metoprolol for now given hypotension  -Cardiology consult in the a.m.  -Echo in the a.m.  -CBC, BMP in the a.m.    PRETTY  -Unclear baseline  -Request records from [facility]  - Suspect this is secondary to mild dehydration with continued use of diuretic and diabetes medication  - Urine studies  -UA pending  -Gentle IVF overnight  -BMP in the a.m.    Elevated troponin  - Second set shows improvement  -Continue to trend  -Trend EKG  -  mg given in the ED, will continue daily dose  -Cardiology consult in the a.m.    Hyponatremia  - Suspect this is secondary to decreased p.o. intake  -Serum osmolality  -Urine osmolality  -Urine electrolytes  -Gentle IVF for now    Leukocytosis  - Low-grade fever noted in the ED  -CXR negative for acute findings  -UA pending  -CBC in the a.m.    Hypotension with history of hypertension  - Hold antihypertensives for now  -Gentle  IVF overnight  -Currently improved since arrival    CHF  -Echo in the a.m.  - Normally controlled with furosemide and Spironolactone, will hold for now given PRETTY  -Daily weight    Diabetes mellitus 2  - FSBG before meals at bedtime  -SS insulin  - Hemoglobin A1c in the a.m.    History of CAD  - CABG x2 at [facility]  -Does not follow with cardiology  - Continue Plavix    Hyperlipidemia  - Controlled with atorvastatin  -Lipid panel in a.m.    Mild dementia  -Continue donepezil    Depression  - Controlled with BuSpar and Celexa, continue home dose    Abrasions on lower ext  - Rocephin and Bactroban and wound care consult       DVT prophylaxis: Heparin    CODE STATUS:    Code Status and Medical Interventions:   Ordered at: 10/22/19 2106     Code Status:    CPR     Medical Interventions (Level of Support Prior to Arrest):    Full       Admission Status:  I believe this patient meets INPATIENT status due to new onset atrial flutter.  I feel patient’s risk for adverse outcomes and need for care warrant INPATIENT evaluation and I predict the patient’s care encounter to likely last beyond 2 midnights.      Electronically signed by MAUREEN Durham, 10/22/19, 10:14 PM.        Brief Attending Admission Attestation     I have seen and examined the patient, performing an independent face-to-face diagnostic evaluation with plan of care reviewed and developed with the advanced practice clinician (APC).      Brief Summary Statement:   Allyn Dodge is a 89 y.o. female with Hx of CHF, dementia, T2DM, and recent dx of atrial fib and atrial flutter. Pt was started on beta blocker 2 weeks ago and became lethargic and hypotensive. She had decreased appetite and family reports decreased PO in take for the past 2 days. Pt GD advised that pt had not wanted to get out of bed for the past 2 days. When pt was working with PT she became lethargic and hypotensive and was sent to [facility] ED. She was noted to have an increased trop in the ED and  hypotension, PRETTY and atrial flutter. Pt will be admitted to telemetry for further evaluation and management.       Remainder of detailed HPI is as noted above and has been reviewed and/or edited by me for completeness.      Attending Physical Exam:  Constitutional: No acute distress, awake, alert  HENT: NCAT, mucous membranes moist  Respiratory: Clear to auscultation bilaterally, respiratory effort normal   Cardiovascular: RRR, no murmurs, rubs, or gallops, palpable pedal pulses bilaterally  Gastrointestinal: Positive bowel sounds, soft, nontender, nondistended  Musculoskeletal: No bilateral ankle edema  Psychiatric: Appropriate affect, cooperative  Neurologic: Oriented x 3, strength symmetric in all extremities, Cranial Nerves grossly intact to confrontation, speech clear  Skin: excoriation and abrasions in anterior tibial region         Brief Assessment/Plan :  See above for further detailed assessment and plan developed with APC which I have reviewed and/or edited for completeness.      Electronically signed by Alona Méndez DO, 10/23/19, 5:09 AM.               Electronically signed by Alona Méndez DO at 10/23/19 0538          Discharge Summary      Patric Hannah MD at 10/24/19 1254              [facility] Medicine Services  DISCHARGE SUMMARY    Patient Name: Allyn Dodge  : 1929  MRN: [id number]    Date of Admission: 10/22/2019  Date of Discharge:  10/24/19  Primary Care Physician: Miguel Angel Carrillo MD    Consults     Date and Time Order Name Status Description    10/23/2019 0030 Inpatient Cardiology Consult Completed           Hospital Course     Presenting Problem:   Atrial flutter with rapid ventricular response (CMS/HCC) [I48.92]    Active Hospital Problems    Diagnosis  POA   • **PRETTY (acute kidney injury) (CMS/HCC) [N17.9]  Yes     Priority: Medium   • Abrasions of multiple sites [T07.XXXA]  Yes   • Atrial flutter with rapid ventricular response (CMS/HCC) [I48.92]   Yes   • Essential hypertension [I10]  Yes   • Type 2 diabetes mellitus (CMS/HCC) [E11.9]  Yes   • Dementia (CMS/HCC) [F03.90]  Yes   • Elevated troponin [R79.89]  Yes   • Hyponatremia [E87.1]  Yes   • Hypotension [I95.9]  Yes      Resolved Hospital Problems   No resolved problems to display.          Hospital Course:  Allyn Dodge is a 89 y.o. female with a history of recently diagnosed A. fib, coronary artery disease status post CABG, type 2 diabetes, hypertension, dementia who presents to the emergency room with complaints of generalized weakness.  She was just diagnosed with A. fib recently was placed on metoprolol and since that time has been getting increasingly weak.  She also had been taking multiple antihypertensives at home.  Upon presentation blood pressure was noted to be 94/34.  She was in a flutter which was rate controlled.  Creatinine noted to be 3.6 with nothing here for comparison.  She was admitted for further evaluation.  Blood pressure medications were held including Norvasc, lisinopril, Aldactone, hydrochlorothiazide.  She did receive some IV fluids.  Renal ultrasound was unremarkable.  Creatinine has improved and at the day of discharge is 2.6.  Baseline is still unclear and I suspect she does have some chronic dysfunction but definitely did have an acute component likely related to dehydration and hypotension.  Blood pressure is improved and will continue to hold those medications.  She was seen by cardiology in regards to her A. fib.  Recommend continuing of beta-blocker.  Also recommend initiating renally dosed Eliquis at 2.5 mg twice daily and stopping her Plavix while continuing her aspirin.  Day of discharge she is very close to her baseline and anxious to get home.  She has been to go home with care of her family.  I would like a primary care physician to recheck a BMP within 1week to ensure stability.  Also of note that TSH was somewhat low at 0.14 and free T4 was elevated at 2.10.  No  overt signs of hyperthyroidism and I think they should be rechecked prior to initiating therapy, therefore would recommend they get rechecked in the next 4 to 6 weeks.      Discharge Follow Up Recommendations for labs/diagnostics:  Recheck BMP within 1 week  Recheck TSH in 4-6 weeks    Day of Discharge     HPI:   Continue to feel improved.  Wants to go home.  Grand-dtr present and feels comfortable with that plan.      Review of Systems  Gen- No fevers, chills  CV- No chest pain, palpitations  Resp- No cough, dyspnea  GI- No N/V/D, abd pain      Otherwise ROS is negative except as mentioned in the HPI.    Vital Signs:   Temp:  [97.7 °F (36.5 °C)-98.4 °F (36.9 °C)] 97.9 °F (36.6 °C)  Heart Rate:  [76-84] 76  Resp:  [17-20] 17  BP: ()/(51-87) 96/51     Physical Exam:  Constitutional: No acute distress, awake, alert, looks younger than age  HENT: NCAT, mucous membranes moist  Respiratory: Clear to auscultation bilaterally, respiratory effort normal   Cardiovascular: irregular, no murmurs, rubs, or gallops,  Gastrointestinal: obese, Positive bowel sounds, soft, nontender, nondistended  Musculoskeletal: No bilateral ankle edema  Psychiatric: Appropriate affect, cooperative  Neurologic: Oriented x 3, no focal deficits  Skin: No rashes      Pertinent  and/or Most Recent Results     Results from last 7 days   Lab Units 10/24/19  0445 10/23/19  0202 10/22/19  1702   WBC 10*3/mm3 9.07 8.74 14.92*   HEMOGLOBIN g/dL 10.6* 11.0* 12.0   HEMATOCRIT % 33.8* 35.0 38.4   PLATELETS 10*3/mm3 294 302 328   SODIUM mmol/L 132* 133* 130*   POTASSIUM mmol/L 4.0 4.1 4.1   CHLORIDE mmol/L 100 94* 93*   CO2 mmol/L 20.0* 24.0 22.0   BUN mg/dL 84* 87* 90*   CREATININE mg/dL 2.63* 3.42* 3.66*   GLUCOSE mg/dL 80 174* 231*   CALCIUM mg/dL 9.0 9.2 9.3     Results from last 7 days   Lab Units 10/22/19  1702   BILIRUBIN mg/dL 0.5   ALK PHOS U/L 48   ALT (SGPT) U/L 10   AST (SGOT) U/L 11     Results from last 7 days   Lab Units 10/23/19  5988    CHOLESTEROL mg/dL 132   TRIGLYCERIDES mg/dL 94   HDL CHOL mg/dL 45     Results from last 7 days   Lab Units 10/23/19  0202 10/23/19  0018 10/22/19  1951 10/22/19  1702   TSH uIU/mL  --  0.142*  --   --    HEMOGLOBIN A1C % 10.40*  --   --   --    PROBNP pg/mL  --   --   --  945.1   TROPONIN T ng/mL  --  0.025 0.024 0.035*   PROCALCITONIN ng/mL  --  0.09*  --   --    LACTATE mmol/L 1.4  --   --   --        Brief Urine Lab Results  (Last result in the past 365 days)      Color   Clarity   Blood   Leuk Est   Nitrite   Protein   CREAT   Urine HCG        10/23/19 0030 Yellow Clear Negative Moderate (2+) Negative Negative               Microbiology Results Abnormal     Procedure Component Value - Date/Time    Blood Culture - Blood, Arm, Right [[id number]] Collected:  10/23/19 0202    Lab Status:  Preliminary result Specimen:  Blood from Arm, Right Updated:  10/24/19 0309     Blood Culture No growth at 24 hours    Blood Culture - Blood, Arm, Left [[id number]] Collected:  10/23/19 0202    Lab Status:  Preliminary result Specimen:  Blood from Arm, Left Updated:  10/24/19 0309     Blood Culture No growth at 24 hours          Imaging Results (all)     Procedure Component Value Units Date/Time    US Renal Bilateral [[id number]] Collected:  10/24/19 0816     Updated:  10/24/19 0955    Narrative:       EXAMINATION: US RENAL BILATERAL- 10/23/2019     INDICATION: PRETTY on CKD; I48.92-Unspecified atrial flutter;  N28.9-Disorder of kidney and ureter, unspecified; N18.9-Chronic kidney  disease, unspecified; Z86.79-Personal history of other diseases of the  circulatory system; Z86.59-Personal history of other mental and  behavioral disorders; Z86.79-Personal history of other diseases of the  circulatory system      TECHNIQUE: Ultrasound kidneys and urinary bladder     COMPARISON: NONE     FINDINGS:      Right kidney measures 8.5 cm in length with a 1.6 cm area of  hypoechogenicity at the periphery of simple renal cortical cyst  variant  without complex features node internally or internal flow. No  hydronephrosis or calculus.     Left kidney measures 7.7 cm in length containing 1.8 cm area of  hypoechogenicity at the periphery of simple renal cortical cyst variant  without complex features noted internally or internal flow identified.   No hydronephrosis or calculus.     Urinary bladder is minimally distended and grossly unremarkable.       Impression:       No acute sonographic findings within the visualized kidneys  or urinary bladder, specifically no hydronephrosis.      D:  10/24/2019  E:  10/24/2019       XR Chest 1 View [[id number]] Collected:  10/22/19 1646     Updated:  10/22/19 1744    Narrative:       EXAMINATION: XR CHEST 1 VW-10/22/2019:      INDICATION: Weak/Dizzy/AMS triage protocol.      COMPARISON: NONE.     FINDINGS: The heart is at the upper limits of normal. There are  postoperative cardiac changes. The heart is compensated. There is no  acute pulmonary process. There is no mass or effusion.           Impression:       Chronic pulmonary findings. There are no acute  abnormalities.     D:  10/22/2019  E:  10/22/2019     This report was finalized on 10/22/2019 5:41 PM by Dr. Honorio Rios MD.                       Results for orders placed during the hospital encounter of 10/22/19   Adult Transthoracic Echo Complete With Contrast if Necessary Per Protocol    Narrative · Left ventricular systolic function is normal  · Ejection fraction variable due to atrial flutter. Estimated EF appears   to be in the range of 56 - 60%.  · Right ventricular cavity is mild-to-moderately dilated. Mildly reduced   right ventricular systolic function noted.  · Mild biatrial enlargement  · Mild MAC is present. Mild mitral valve regurgitation is present.  · Mild tricuspid valve regurgitation is present.Estimated right   ventricular systolic pressure from tricuspid regurgitation is normal (<35   mmHg).           Order Current Status    Blood  Culture - Blood, Arm, Left Preliminary result    Blood Culture - Blood, Arm, Right Preliminary result        Discharge Details        Discharge Medications      New Medications      Instructions Start Date   apixaban 2.5 MG tablet tablet  Commonly known as:  ELIQUIS   2.5 mg, Oral, Every 12 Hours Scheduled         Continue These Medications      Instructions Start Date   atorvastatin 10 MG tablet  Commonly known as:  LIPITOR   10 mg, Oral, Nightly      busPIRone 15 MG tablet  Commonly known as:  BUSPAR   15 mg, Oral, 2 Times Daily      citalopram 20 MG tablet  Commonly known as:  CeleXA   20 mg, Oral, Daily      donepezil 10 MG tablet  Commonly known as:  ARICEPT   10 mg, Oral, Nightly      gabapentin 300 MG capsule  Commonly known as:  NEURONTIN   300 mg, Oral, 3 Times Daily      glimepiride 2 MG tablet  Commonly known as:  AMARYL   2 mg, Oral, Every Morning Before Breakfast      HYDROXYZINE HCL PO   25 mg, Oral, Every 8 Hours PRN      levocetirizine 2.5 MG/5ML solution  Commonly known as:  XYZAL   5 mg, Oral, Every Evening      metoprolol tartrate 25 MG tablet  Commonly known as:  LOPRESSOR   25 mg, Oral, Daily      oxybutynin 5 MG tablet  Commonly known as:  DITROPAN   5 mg, Oral, Daily      QUEtiapine 25 MG tablet  Commonly known as:  SEROquel   12.5 mg, Oral, Daily PRN         Stop These Medications    amLODIPine 5 MG tablet  Commonly known as:  NORVASC     clopidogrel 75 MG tablet  Commonly known as:  PLAVIX     furosemide 20 MG tablet  Commonly known as:  LASIX     hydroCHLOROthiazide 25 MG tablet  Commonly known as:  HYDRODIURIL     lisinopril 40 MG tablet  Commonly known as:  PRINIVIL,ZESTRIL     predniSONE 20 MG tablet  Commonly known as:  DELTASONE     spironolactone 25 MG tablet  Commonly known as:  ALDACTONE            Allergies   Allergen Reactions   • Codeine Other (See Comments)     UNKNOWN   • Penicillins Other (See Comments)     UNKNOWN         Discharge Disposition:  Home or Self  Care    Diet:  Hospital:  Diet Order   Procedures   • Diet Regular; Cardiac, Consistent Carbohydrate     Discharge:   Diet Instructions     Diet: Regular, Consistent Carbohydrate, Cardiac      Discharge Diet:   Regular  Consistent Carbohydrate  Cardiac             Discharge Activity:   Activity Instructions     Activity as Tolerated              CODE STATUS:    Code Status and Medical Interventions:   Ordered at: 10/22/19 2106     Code Status:    CPR     Medical Interventions (Level of Support Prior to Arrest):    Full         No future appointments.    Additional Instructions for the Follow-ups that You Need to Schedule     Discharge Follow-up with PCP   As directed       Currently Documented PCP:    Miguel Angel Carrillo MD    PCP Phone Number:    [phone]     Follow Up Details:  PCP 1 week               Time Spent on Discharge:  38 minutes    Electronically signed by Patric Hannah MD, 10/24/19, 12:54 PM.        Electronically signed by Patric Hannah MD at 10/24/19 1305

## 2019-10-24 NOTE — PROGRESS NOTES
Patient is on Apixaban.  Education provided on 10/24 verbally and in writing.  Discussed effects of medication,  drug-drug and drug-food interactions, and signs/symptoms of bleeding and clotting.  Patient verbalized understanding through teach back.  All pertinent questions were answered.      Santy Fuchs RPH  10/24/2019  1:35 PM

## 2019-10-24 NOTE — CONSULTS
No family present at this time for attempted education regarding diabetes management. Patient lives at home with spouse. Patient has intermittent periods of confusion. Will continue to monitor progress. Thank you for this referral.

## 2019-10-25 ENCOUNTER — READMISSION MANAGEMENT (OUTPATIENT)
Dept: CALL CENTER | Facility: HOSPITAL | Age: 84
End: 2019-10-25

## 2019-10-25 NOTE — OUTREACH NOTE
Prep Survey      Responses   Facility patient discharged from?  [facility]   Is patient eligible?  Yes   Discharge diagnosis  Atrial flutter with rapid ventricular response,  PRETTY   Does the patient have one of the following disease processes/diagnoses(primary or secondary)?  Other   Does the patient have Home health ordered?  Yes   What is the Home health agency?   [facility] to resume   Is there a DME ordered?  No   General alerts for this patient  Dementia   Prep survey completed?  Yes          Dixie Brooks RN

## 2019-10-28 ENCOUNTER — READMISSION MANAGEMENT (OUTPATIENT)
Dept: CALL CENTER | Facility: HOSPITAL | Age: 84
End: 2019-10-28

## 2019-10-28 LAB
BACTERIA SPEC AEROBE CULT: NORMAL
BACTERIA SPEC AEROBE CULT: NORMAL

## 2019-10-28 NOTE — OUTREACH NOTE
Medical Week 1 Survey      Responses   Facility patient discharged from?  [facility]   Does the patient have one of the following disease processes/diagnoses(primary or secondary)?  Other   Is there a successful TCM telephone encounter documented?  No   Week 1 attempt successful?  Yes   Call start time  1650   Revoke  Readmitted [At [facility]]   General alerts for this patient  Dementia   Discharge diagnosis  Atrial flutter with rapid ventricular response,  PRETTY Goode RN